# Patient Record
Sex: MALE | Race: WHITE | NOT HISPANIC OR LATINO | Employment: FULL TIME | URBAN - METROPOLITAN AREA
[De-identification: names, ages, dates, MRNs, and addresses within clinical notes are randomized per-mention and may not be internally consistent; named-entity substitution may affect disease eponyms.]

---

## 2020-07-08 ENCOUNTER — CLINICAL SUPPORT (OUTPATIENT)
Dept: URGENT CARE | Facility: CLINIC | Age: 25
End: 2020-07-08
Payer: COMMERCIAL

## 2020-07-08 DIAGNOSIS — Z11.59 ENCOUNTER FOR SCREENING FOR OTHER VIRAL DISEASES: Primary | ICD-10-CM

## 2020-07-08 PROCEDURE — 99211 OFF/OP EST MAY X REQ PHY/QHP: CPT | Mod: S$GLB,CS,, | Performed by: FAMILY MEDICINE

## 2020-07-08 PROCEDURE — 99211 PR OFFICE/OUTPT VISIT, EST, LEVL I: ICD-10-PCS | Mod: S$GLB,CS,, | Performed by: FAMILY MEDICINE

## 2020-07-08 PROCEDURE — U0003 INFECTIOUS AGENT DETECTION BY NUCLEIC ACID (DNA OR RNA); SEVERE ACUTE RESPIRATORY SYNDROME CORONAVIRUS 2 (SARS-COV-2) (CORONAVIRUS DISEASE [COVID-19]), AMPLIFIED PROBE TECHNIQUE, MAKING USE OF HIGH THROUGHPUT TECHNOLOGIES AS DESCRIBED BY CMS-2020-01-R: HCPCS

## 2020-07-08 NOTE — PROGRESS NOTES
NOTE for patient presenting for COVID test with no symptoms. Covid swab ok's by Dr. Georgi Celis

## 2020-07-11 ENCOUNTER — TELEPHONE (OUTPATIENT)
Dept: URGENT CARE | Facility: CLINIC | Age: 25
End: 2020-07-11

## 2020-07-11 DIAGNOSIS — U07.1 COVID-19 VIRUS DETECTED: ICD-10-CM

## 2020-07-11 LAB — SARS-COV-2 RNA RESP QL NAA+PROBE: DETECTED

## 2021-02-01 ENCOUNTER — OFFICE VISIT (OUTPATIENT)
Dept: PRIMARY CARE CLINIC | Facility: CLINIC | Age: 26
End: 2021-02-01
Payer: COMMERCIAL

## 2021-02-01 VITALS
SYSTOLIC BLOOD PRESSURE: 126 MMHG | BODY MASS INDEX: 26.73 KG/M2 | HEART RATE: 48 BPM | HEIGHT: 72 IN | WEIGHT: 197.31 LBS | TEMPERATURE: 98 F | DIASTOLIC BLOOD PRESSURE: 76 MMHG | RESPIRATION RATE: 18 BRPM | OXYGEN SATURATION: 98 %

## 2021-02-01 DIAGNOSIS — M96.1 POSTLAMINECTOMY SYNDROME: ICD-10-CM

## 2021-02-01 DIAGNOSIS — F41.9 ANXIETY: ICD-10-CM

## 2021-02-01 DIAGNOSIS — Z72.51 HIGH RISK SEXUAL BEHAVIOR, UNSPECIFIED TYPE: ICD-10-CM

## 2021-02-01 DIAGNOSIS — R01.1 HEART MURMUR: ICD-10-CM

## 2021-02-01 DIAGNOSIS — Z00.00 NORMAL PHYSICAL EXAM, ROUTINE: Primary | ICD-10-CM

## 2021-02-01 DIAGNOSIS — Z13.220 SCREENING FOR LIPOID DISORDERS: ICD-10-CM

## 2021-02-01 PROBLEM — F32.A DEPRESSION: Status: ACTIVE | Noted: 2021-02-01

## 2021-02-01 PROBLEM — F32.A DEPRESSION: Status: RESOLVED | Noted: 2021-02-01 | Resolved: 2021-02-01

## 2021-02-01 PROCEDURE — 1126F AMNT PAIN NOTED NONE PRSNT: CPT | Mod: S$GLB,,, | Performed by: INTERNAL MEDICINE

## 2021-02-01 PROCEDURE — 99999 PR PBB SHADOW E&M-EST. PATIENT-LVL III: ICD-10-PCS | Mod: PBBFAC,,, | Performed by: INTERNAL MEDICINE

## 2021-02-01 PROCEDURE — 3008F BODY MASS INDEX DOCD: CPT | Mod: CPTII,S$GLB,, | Performed by: INTERNAL MEDICINE

## 2021-02-01 PROCEDURE — 99999 PR PBB SHADOW E&M-EST. PATIENT-LVL III: CPT | Mod: PBBFAC,,, | Performed by: INTERNAL MEDICINE

## 2021-02-01 PROCEDURE — 99385 PREV VISIT NEW AGE 18-39: CPT | Mod: S$GLB,,, | Performed by: INTERNAL MEDICINE

## 2021-02-01 PROCEDURE — 1126F PR PAIN SEVERITY QUANTIFIED, NO PAIN PRESENT: ICD-10-PCS | Mod: S$GLB,,, | Performed by: INTERNAL MEDICINE

## 2021-02-01 PROCEDURE — 99385 PR PREVENTIVE VISIT,NEW,18-39: ICD-10-PCS | Mod: S$GLB,,, | Performed by: INTERNAL MEDICINE

## 2021-02-01 PROCEDURE — 3008F PR BODY MASS INDEX (BMI) DOCUMENTED: ICD-10-PCS | Mod: CPTII,S$GLB,, | Performed by: INTERNAL MEDICINE

## 2021-02-01 RX ORDER — BENZONATATE 200 MG/1
1 CAPSULE ORAL
COMMUNITY
Start: 2020-07-07 | End: 2022-05-17

## 2021-02-01 RX ORDER — SERTRALINE HYDROCHLORIDE 50 MG/1
50 TABLET, FILM COATED ORAL DAILY
COMMUNITY
Start: 2020-11-09 | End: 2021-02-01 | Stop reason: SDUPTHER

## 2021-02-01 RX ORDER — SERTRALINE HYDROCHLORIDE 50 MG/1
50 TABLET, FILM COATED ORAL DAILY
Qty: 90 TABLET | Refills: 3 | Status: SHIPPED | OUTPATIENT
Start: 2021-02-01 | End: 2022-02-06

## 2021-02-01 RX ORDER — COVID-19 MOLECULAR TEST ASSAY
KIT MISCELLANEOUS
COMMUNITY
Start: 2020-12-27 | End: 2021-02-01

## 2021-02-03 ENCOUNTER — LAB VISIT (OUTPATIENT)
Dept: LAB | Facility: HOSPITAL | Age: 26
End: 2021-02-03
Attending: INTERNAL MEDICINE
Payer: COMMERCIAL

## 2021-02-03 DIAGNOSIS — Z72.51 HIGH RISK SEXUAL BEHAVIOR, UNSPECIFIED TYPE: ICD-10-CM

## 2021-02-03 DIAGNOSIS — Z13.220 SCREENING FOR LIPOID DISORDERS: ICD-10-CM

## 2021-02-03 DIAGNOSIS — Z00.00 NORMAL PHYSICAL EXAM, ROUTINE: ICD-10-CM

## 2021-02-03 LAB
BASOPHILS # BLD AUTO: 0.04 K/UL (ref 0–0.2)
BASOPHILS NFR BLD: 1.1 % (ref 0–1.9)
DIFFERENTIAL METHOD: ABNORMAL
EOSINOPHIL # BLD AUTO: 0.1 K/UL (ref 0–0.5)
EOSINOPHIL NFR BLD: 2.1 % (ref 0–8)
ERYTHROCYTE [DISTWIDTH] IN BLOOD BY AUTOMATED COUNT: 11.9 % (ref 11.5–14.5)
HCT VFR BLD AUTO: 44 % (ref 40–54)
HGB BLD-MCNC: 14.6 G/DL (ref 14–18)
IMM GRANULOCYTES # BLD AUTO: 0.01 K/UL (ref 0–0.04)
IMM GRANULOCYTES NFR BLD AUTO: 0.3 % (ref 0–0.5)
LYMPHOCYTES # BLD AUTO: 1.4 K/UL (ref 1–4.8)
LYMPHOCYTES NFR BLD: 37.4 % (ref 18–48)
MCH RBC QN AUTO: 29.9 PG (ref 27–31)
MCHC RBC AUTO-ENTMCNC: 33.2 G/DL (ref 32–36)
MCV RBC AUTO: 90 FL (ref 82–98)
MONOCYTES # BLD AUTO: 0.4 K/UL (ref 0.3–1)
MONOCYTES NFR BLD: 10.3 % (ref 4–15)
NEUTROPHILS # BLD AUTO: 1.8 K/UL (ref 1.8–7.7)
NEUTROPHILS NFR BLD: 48.8 % (ref 38–73)
NRBC BLD-RTO: 0 /100 WBC
PLATELET # BLD AUTO: 255 K/UL (ref 150–350)
PMV BLD AUTO: 12.4 FL (ref 9.2–12.9)
RBC # BLD AUTO: 4.88 M/UL (ref 4.6–6.2)
WBC # BLD AUTO: 3.77 K/UL (ref 3.9–12.7)

## 2021-02-03 PROCEDURE — 80074 ACUTE HEPATITIS PANEL: CPT

## 2021-02-03 PROCEDURE — 86703 HIV-1/HIV-2 1 RESULT ANTBDY: CPT

## 2021-02-03 PROCEDURE — 86592 SYPHILIS TEST NON-TREP QUAL: CPT

## 2021-02-03 PROCEDURE — 36415 COLL VENOUS BLD VENIPUNCTURE: CPT | Mod: PN

## 2021-02-03 PROCEDURE — 85025 COMPLETE CBC W/AUTO DIFF WBC: CPT

## 2021-02-03 PROCEDURE — 86695 HERPES SIMPLEX TYPE 1 TEST: CPT

## 2021-02-03 PROCEDURE — 80061 LIPID PANEL: CPT

## 2021-02-03 PROCEDURE — 84439 ASSAY OF FREE THYROXINE: CPT

## 2021-02-03 PROCEDURE — 86694 HERPES SIMPLEX NES ANTBDY: CPT

## 2021-02-03 PROCEDURE — 84443 ASSAY THYROID STIM HORMONE: CPT

## 2021-02-03 PROCEDURE — 86696 HERPES SIMPLEX TYPE 2 TEST: CPT

## 2021-02-03 PROCEDURE — 80053 COMPREHEN METABOLIC PANEL: CPT

## 2021-02-04 LAB
ALBUMIN SERPL BCP-MCNC: 4.8 G/DL (ref 3.5–5.2)
ALP SERPL-CCNC: 52 U/L (ref 55–135)
ALT SERPL W/O P-5'-P-CCNC: 17 U/L (ref 10–44)
ANION GAP SERPL CALC-SCNC: 14 MMOL/L (ref 8–16)
AST SERPL-CCNC: 25 U/L (ref 10–40)
BILIRUB SERPL-MCNC: 2.3 MG/DL (ref 0.1–1)
BUN SERPL-MCNC: 13 MG/DL (ref 6–20)
CALCIUM SERPL-MCNC: 10.2 MG/DL (ref 8.7–10.5)
CHLORIDE SERPL-SCNC: 103 MMOL/L (ref 95–110)
CHOLEST SERPL-MCNC: 186 MG/DL (ref 120–199)
CHOLEST/HDLC SERPL: 3.4 {RATIO} (ref 2–5)
CO2 SERPL-SCNC: 23 MMOL/L (ref 23–29)
CREAT SERPL-MCNC: 1 MG/DL (ref 0.5–1.4)
EST. GFR  (AFRICAN AMERICAN): >60 ML/MIN/1.73 M^2
EST. GFR  (NON AFRICAN AMERICAN): >60 ML/MIN/1.73 M^2
GLUCOSE SERPL-MCNC: 73 MG/DL (ref 70–110)
HAV IGM SERPL QL IA: NEGATIVE
HBV CORE IGM SERPL QL IA: NEGATIVE
HBV SURFACE AG SERPL QL IA: NEGATIVE
HCV AB SERPL QL IA: NEGATIVE
HDLC SERPL-MCNC: 54 MG/DL (ref 40–75)
HDLC SERPL: 29 % (ref 20–50)
HIV 1+2 AB+HIV1 P24 AG SERPL QL IA: NEGATIVE
HSV1 IGG SERPL QL IA: NEGATIVE
HSV2 IGG SERPL QL IA: NEGATIVE
LDLC SERPL CALC-MCNC: 112.2 MG/DL (ref 63–159)
NONHDLC SERPL-MCNC: 132 MG/DL
POTASSIUM SERPL-SCNC: 4 MMOL/L (ref 3.5–5.1)
PROT SERPL-MCNC: 7.3 G/DL (ref 6–8.4)
RPR SER QL: NORMAL
SODIUM SERPL-SCNC: 140 MMOL/L (ref 136–145)
T4 FREE SERPL-MCNC: 1.06 NG/DL (ref 0.71–1.51)
TRIGL SERPL-MCNC: 99 MG/DL (ref 30–150)
TSH SERPL DL<=0.005 MIU/L-ACNC: 2.27 UIU/ML (ref 0.4–4)

## 2021-02-08 LAB — HSV AB, IGM BY EIA: 0.25 INDEX

## 2021-02-10 ENCOUNTER — OFFICE VISIT (OUTPATIENT)
Dept: URGENT CARE | Facility: CLINIC | Age: 26
End: 2021-02-10
Payer: COMMERCIAL

## 2021-02-10 VITALS
HEIGHT: 72 IN | RESPIRATION RATE: 19 BRPM | DIASTOLIC BLOOD PRESSURE: 75 MMHG | HEART RATE: 47 BPM | BODY MASS INDEX: 25.06 KG/M2 | TEMPERATURE: 98 F | OXYGEN SATURATION: 100 % | SYSTOLIC BLOOD PRESSURE: 133 MMHG | WEIGHT: 185 LBS

## 2021-02-10 DIAGNOSIS — D72.819 LEUKOPENIA, UNSPECIFIED TYPE: ICD-10-CM

## 2021-02-10 DIAGNOSIS — J98.01 ACUTE BRONCHOSPASM: Primary | ICD-10-CM

## 2021-02-10 DIAGNOSIS — E80.6 HYPERBILIRUBINEMIA: Primary | ICD-10-CM

## 2021-02-10 DIAGNOSIS — J20.9 ACUTE PURULENT BRONCHITIS: ICD-10-CM

## 2021-02-10 PROCEDURE — 3008F PR BODY MASS INDEX (BMI) DOCUMENTED: ICD-10-PCS | Mod: CPTII,S$GLB,, | Performed by: INTERNAL MEDICINE

## 2021-02-10 PROCEDURE — 96372 THER/PROPH/DIAG INJ SC/IM: CPT | Mod: S$GLB,,, | Performed by: INTERNAL MEDICINE

## 2021-02-10 PROCEDURE — 99214 PR OFFICE/OUTPT VISIT, EST, LEVL IV, 30-39 MIN: ICD-10-PCS | Mod: 25,S$GLB,, | Performed by: INTERNAL MEDICINE

## 2021-02-10 PROCEDURE — 99214 OFFICE O/P EST MOD 30 MIN: CPT | Mod: 25,S$GLB,, | Performed by: INTERNAL MEDICINE

## 2021-02-10 PROCEDURE — 3008F BODY MASS INDEX DOCD: CPT | Mod: CPTII,S$GLB,, | Performed by: INTERNAL MEDICINE

## 2021-02-10 PROCEDURE — 96372 PR INJECTION,THERAP/PROPH/DIAG2ST, IM OR SUBCUT: ICD-10-PCS | Mod: S$GLB,,, | Performed by: INTERNAL MEDICINE

## 2021-02-10 RX ORDER — DEXAMETHASONE SODIUM PHOSPHATE 100 MG/10ML
10 INJECTION INTRAMUSCULAR; INTRAVENOUS
Status: COMPLETED | OUTPATIENT
Start: 2021-02-10 | End: 2021-02-10

## 2021-02-10 RX ADMIN — DEXAMETHASONE SODIUM PHOSPHATE 10 MG: 100 INJECTION INTRAMUSCULAR; INTRAVENOUS at 01:02

## 2021-02-22 ENCOUNTER — PATIENT MESSAGE (OUTPATIENT)
Dept: PRIMARY CARE CLINIC | Facility: CLINIC | Age: 26
End: 2021-02-22

## 2021-04-12 ENCOUNTER — PATIENT MESSAGE (OUTPATIENT)
Dept: RESEARCH | Facility: HOSPITAL | Age: 26
End: 2021-04-12

## 2021-08-18 ENCOUNTER — OFFICE VISIT (OUTPATIENT)
Dept: URGENT CARE | Facility: CLINIC | Age: 26
End: 2021-08-18
Payer: COMMERCIAL

## 2021-08-18 VITALS
OXYGEN SATURATION: 98 % | BODY MASS INDEX: 25.06 KG/M2 | SYSTOLIC BLOOD PRESSURE: 129 MMHG | DIASTOLIC BLOOD PRESSURE: 75 MMHG | HEIGHT: 72 IN | HEART RATE: 47 BPM | WEIGHT: 185 LBS | TEMPERATURE: 98 F | RESPIRATION RATE: 16 BRPM

## 2021-08-18 DIAGNOSIS — R05.9 COUGH: Primary | ICD-10-CM

## 2021-08-18 DIAGNOSIS — J01.40 ACUTE PANSINUSITIS, RECURRENCE NOT SPECIFIED: ICD-10-CM

## 2021-08-18 DIAGNOSIS — J20.9 ACUTE PURULENT BRONCHITIS: ICD-10-CM

## 2021-08-18 DIAGNOSIS — J98.01 ACUTE BRONCHOSPASM: ICD-10-CM

## 2021-08-18 LAB
CTP QC/QA: YES
SARS-COV-2 RDRP RESP QL NAA+PROBE: NEGATIVE

## 2021-08-18 PROCEDURE — 3008F BODY MASS INDEX DOCD: CPT | Mod: CPTII,S$GLB,, | Performed by: INTERNAL MEDICINE

## 2021-08-18 PROCEDURE — 1160F PR REVIEW ALL MEDS BY PRESCRIBER/CLIN PHARMACIST DOCUMENTED: ICD-10-PCS | Mod: CPTII,S$GLB,, | Performed by: INTERNAL MEDICINE

## 2021-08-18 PROCEDURE — 3074F SYST BP LT 130 MM HG: CPT | Mod: CPTII,S$GLB,, | Performed by: INTERNAL MEDICINE

## 2021-08-18 PROCEDURE — 99214 PR OFFICE/OUTPT VISIT, EST, LEVL IV, 30-39 MIN: ICD-10-PCS | Mod: 25,S$GLB,CS, | Performed by: INTERNAL MEDICINE

## 2021-08-18 PROCEDURE — U0002: ICD-10-PCS | Mod: QW,S$GLB,, | Performed by: INTERNAL MEDICINE

## 2021-08-18 PROCEDURE — 3008F PR BODY MASS INDEX (BMI) DOCUMENTED: ICD-10-PCS | Mod: CPTII,S$GLB,, | Performed by: INTERNAL MEDICINE

## 2021-08-18 PROCEDURE — 1159F MED LIST DOCD IN RCRD: CPT | Mod: CPTII,S$GLB,, | Performed by: INTERNAL MEDICINE

## 2021-08-18 PROCEDURE — 96372 PR INJECTION,THERAP/PROPH/DIAG2ST, IM OR SUBCUT: ICD-10-PCS | Mod: S$GLB,,, | Performed by: INTERNAL MEDICINE

## 2021-08-18 PROCEDURE — 1160F RVW MEDS BY RX/DR IN RCRD: CPT | Mod: CPTII,S$GLB,, | Performed by: INTERNAL MEDICINE

## 2021-08-18 PROCEDURE — 3078F DIAST BP <80 MM HG: CPT | Mod: CPTII,S$GLB,, | Performed by: INTERNAL MEDICINE

## 2021-08-18 PROCEDURE — U0002 COVID-19 LAB TEST NON-CDC: HCPCS | Mod: QW,S$GLB,, | Performed by: INTERNAL MEDICINE

## 2021-08-18 PROCEDURE — 1159F PR MEDICATION LIST DOCUMENTED IN MEDICAL RECORD: ICD-10-PCS | Mod: CPTII,S$GLB,, | Performed by: INTERNAL MEDICINE

## 2021-08-18 PROCEDURE — 3074F PR MOST RECENT SYSTOLIC BLOOD PRESSURE < 130 MM HG: ICD-10-PCS | Mod: CPTII,S$GLB,, | Performed by: INTERNAL MEDICINE

## 2021-08-18 PROCEDURE — 99214 OFFICE O/P EST MOD 30 MIN: CPT | Mod: 25,S$GLB,CS, | Performed by: INTERNAL MEDICINE

## 2021-08-18 PROCEDURE — 3078F PR MOST RECENT DIASTOLIC BLOOD PRESSURE < 80 MM HG: ICD-10-PCS | Mod: CPTII,S$GLB,, | Performed by: INTERNAL MEDICINE

## 2021-08-18 PROCEDURE — 96372 THER/PROPH/DIAG INJ SC/IM: CPT | Mod: S$GLB,,, | Performed by: INTERNAL MEDICINE

## 2021-08-18 RX ORDER — DEXAMETHASONE SODIUM PHOSPHATE 100 MG/10ML
10 INJECTION INTRAMUSCULAR; INTRAVENOUS
Status: COMPLETED | OUTPATIENT
Start: 2021-08-18 | End: 2021-08-18

## 2021-08-18 RX ADMIN — DEXAMETHASONE SODIUM PHOSPHATE 10 MG: 100 INJECTION INTRAMUSCULAR; INTRAVENOUS at 10:08

## 2021-12-01 ENCOUNTER — TELEPHONE (OUTPATIENT)
Dept: SURGERY | Facility: CLINIC | Age: 26
End: 2021-12-01
Payer: COMMERCIAL

## 2021-12-09 ENCOUNTER — OFFICE VISIT (OUTPATIENT)
Dept: SURGERY | Facility: CLINIC | Age: 26
End: 2021-12-09
Payer: COMMERCIAL

## 2021-12-09 VITALS
HEIGHT: 72 IN | SYSTOLIC BLOOD PRESSURE: 150 MMHG | HEART RATE: 82 BPM | DIASTOLIC BLOOD PRESSURE: 99 MMHG | WEIGHT: 187 LBS | BODY MASS INDEX: 25.33 KG/M2

## 2021-12-09 DIAGNOSIS — L29.0 PERIANAL IRRITATION: Primary | ICD-10-CM

## 2021-12-09 PROCEDURE — 99999 PR PBB SHADOW E&M-EST. PATIENT-LVL III: CPT | Mod: PBBFAC,,, | Performed by: COLON & RECTAL SURGERY

## 2021-12-09 PROCEDURE — 99203 OFFICE O/P NEW LOW 30 MIN: CPT | Mod: 25,S$GLB,, | Performed by: COLON & RECTAL SURGERY

## 2021-12-09 PROCEDURE — 99999 PR PBB SHADOW E&M-EST. PATIENT-LVL III: ICD-10-PCS | Mod: PBBFAC,,, | Performed by: COLON & RECTAL SURGERY

## 2021-12-09 PROCEDURE — 99203 PR OFFICE/OUTPT VISIT, NEW, LEVL III, 30-44 MIN: ICD-10-PCS | Mod: 25,S$GLB,, | Performed by: COLON & RECTAL SURGERY

## 2022-02-05 DIAGNOSIS — F41.9 ANXIETY: ICD-10-CM

## 2022-02-05 NOTE — TELEPHONE ENCOUNTER
Care Due:                  Date            Visit Type   Department     Provider  --------------------------------------------------------------------------------                                NP -                              PRIMARY      [facility]  Last Visit: 02-      CARE ([facility])   CARE           Becca Bynum  Next Visit: None Scheduled  None         None Found                                                            Last  Test          Frequency    Reason                     Performed    Due Date  --------------------------------------------------------------------------------    Office Visit  12 months..  sertraline...............  02- 01-    Powered by [employer]. Reference number: [id number].   2/05/2022 12:23:03 PM CST

## 2022-02-06 RX ORDER — SERTRALINE HYDROCHLORIDE 50 MG/1
50 TABLET, FILM COATED ORAL DAILY
Qty: 90 TABLET | Refills: 0 | Status: SHIPPED | OUTPATIENT
Start: 2022-02-06 | End: 2022-05-17 | Stop reason: SDUPTHER

## 2022-02-16 ENCOUNTER — PATIENT MESSAGE (OUTPATIENT)
Dept: RESEARCH | Facility: HOSPITAL | Age: 27
End: 2022-02-16
Payer: COMMERCIAL

## 2022-05-17 ENCOUNTER — OFFICE VISIT (OUTPATIENT)
Dept: PRIMARY CARE CLINIC | Facility: CLINIC | Age: 27
End: 2022-05-17
Payer: COMMERCIAL

## 2022-05-17 VITALS
BODY MASS INDEX: 26.25 KG/M2 | SYSTOLIC BLOOD PRESSURE: 121 MMHG | TEMPERATURE: 98 F | DIASTOLIC BLOOD PRESSURE: 81 MMHG | RESPIRATION RATE: 18 BRPM | OXYGEN SATURATION: 99 % | HEART RATE: 64 BPM | HEIGHT: 72 IN | WEIGHT: 193.81 LBS

## 2022-05-17 DIAGNOSIS — E80.6 HYPERBILIRUBINEMIA: ICD-10-CM

## 2022-05-17 DIAGNOSIS — Z00.00 NORMAL PHYSICAL EXAM, ROUTINE: Primary | ICD-10-CM

## 2022-05-17 DIAGNOSIS — F41.9 ANXIETY: ICD-10-CM

## 2022-05-17 DIAGNOSIS — Z72.51 HIGH RISK SEXUAL BEHAVIOR, UNSPECIFIED TYPE: ICD-10-CM

## 2022-05-17 DIAGNOSIS — Z13.220 SCREENING FOR LIPOID DISORDERS: ICD-10-CM

## 2022-05-17 DIAGNOSIS — R41.840 CONCENTRATION DEFICIT: ICD-10-CM

## 2022-05-17 DIAGNOSIS — D72.819 LEUKOPENIA, UNSPECIFIED TYPE: ICD-10-CM

## 2022-05-17 PROCEDURE — 3008F PR BODY MASS INDEX (BMI) DOCUMENTED: ICD-10-PCS | Mod: CPTII,S$GLB,, | Performed by: INTERNAL MEDICINE

## 2022-05-17 PROCEDURE — 3008F BODY MASS INDEX DOCD: CPT | Mod: CPTII,S$GLB,, | Performed by: INTERNAL MEDICINE

## 2022-05-17 PROCEDURE — 87491 CHLMYD TRACH DNA AMP PROBE: CPT | Performed by: INTERNAL MEDICINE

## 2022-05-17 PROCEDURE — 99395 PR PREVENTIVE VISIT,EST,18-39: ICD-10-PCS | Mod: S$GLB,,, | Performed by: INTERNAL MEDICINE

## 2022-05-17 PROCEDURE — 1159F PR MEDICATION LIST DOCUMENTED IN MEDICAL RECORD: ICD-10-PCS | Mod: CPTII,S$GLB,, | Performed by: INTERNAL MEDICINE

## 2022-05-17 PROCEDURE — 99999 PR PBB SHADOW E&M-EST. PATIENT-LVL IV: CPT | Mod: PBBFAC,,, | Performed by: INTERNAL MEDICINE

## 2022-05-17 PROCEDURE — 87591 N.GONORRHOEAE DNA AMP PROB: CPT | Performed by: INTERNAL MEDICINE

## 2022-05-17 PROCEDURE — 3074F PR MOST RECENT SYSTOLIC BLOOD PRESSURE < 130 MM HG: ICD-10-PCS | Mod: CPTII,S$GLB,, | Performed by: INTERNAL MEDICINE

## 2022-05-17 PROCEDURE — 1160F PR REVIEW ALL MEDS BY PRESCRIBER/CLIN PHARMACIST DOCUMENTED: ICD-10-PCS | Mod: CPTII,S$GLB,, | Performed by: INTERNAL MEDICINE

## 2022-05-17 PROCEDURE — 3074F SYST BP LT 130 MM HG: CPT | Mod: CPTII,S$GLB,, | Performed by: INTERNAL MEDICINE

## 2022-05-17 PROCEDURE — 3079F DIAST BP 80-89 MM HG: CPT | Mod: CPTII,S$GLB,, | Performed by: INTERNAL MEDICINE

## 2022-05-17 PROCEDURE — 3079F PR MOST RECENT DIASTOLIC BLOOD PRESSURE 80-89 MM HG: ICD-10-PCS | Mod: CPTII,S$GLB,, | Performed by: INTERNAL MEDICINE

## 2022-05-17 PROCEDURE — 99999 PR PBB SHADOW E&M-EST. PATIENT-LVL IV: ICD-10-PCS | Mod: PBBFAC,,, | Performed by: INTERNAL MEDICINE

## 2022-05-17 PROCEDURE — 1159F MED LIST DOCD IN RCRD: CPT | Mod: CPTII,S$GLB,, | Performed by: INTERNAL MEDICINE

## 2022-05-17 PROCEDURE — 99395 PREV VISIT EST AGE 18-39: CPT | Mod: S$GLB,,, | Performed by: INTERNAL MEDICINE

## 2022-05-17 PROCEDURE — 1160F RVW MEDS BY RX/DR IN RCRD: CPT | Mod: CPTII,S$GLB,, | Performed by: INTERNAL MEDICINE

## 2022-05-17 RX ORDER — PREDNISONE 5 MG/1
TABLET ORAL
COMMUNITY
Start: 2021-12-21 | End: 2022-05-17

## 2022-05-17 RX ORDER — SERTRALINE HYDROCHLORIDE 50 MG/1
50 TABLET, FILM COATED ORAL DAILY
Qty: 90 TABLET | Refills: 3 | Status: SHIPPED | OUTPATIENT
Start: 2022-05-17 | End: 2023-07-11

## 2022-05-17 RX ORDER — INFLUENZA VIRUS VACCINE 15; 15; 15; 15 UG/.5ML; UG/.5ML; UG/.5ML; UG/.5ML
SUSPENSION INTRAMUSCULAR
COMMUNITY
Start: 2021-12-14

## 2022-05-17 NOTE — PROGRESS NOTES
[facility] Note    Chief Complaint      Chief Complaint   Patient presents with    Annual Exam       History of Present Illness      Maik Orr Jr. is a 26 y.o. . Anxiety, DJD - L spine, Postlaminectomy Syndrome presents to fu for well visit. Referred by his Mom- Pati Orr. Last visit - 2/1/21    Hyperbilirubinemia -  total bilirubin level is mildly elevated. This has been elevated in the past.  This is usually benign & not worrisome. It is common & can  elevate when you are dehydrated (as with fasting for lab work). It won't cause any problems. Sometimes this can be mildly elevated due to a genetic disorder called Gilbert's which does not require any intervention or treatment. I would just like to repeat this.    Leukopenia - White blood cell count was just below normal. We can repeat this with upcoming labs      Anxiety - Pt on sertraline 50 mg/d.  Doing well.  He was on paxil in past. Had panic attacks in H.S. and was started on paxil and tried to wean off in [employer] and had panic attacks and restarted medication.  He tried gluten free diet - no help. Does well on the sertraline. No SI, no HI.  He just bought a new house.      DJD L -spine -  Was working  out 5 d/wk until he tore rotator cuff.  He is not on any meds for this. He gets injections prn.      Postlaminectomy Syndrome - Was working out 5 d/wk.  He is not on any meds for this. He gets injections prn.      Lumbar stenosis -  Was working out 5 d/wk.  He is not on any meds for this. He gets injections prn.      Heart murmur -   Pt aware since childhood. Had prev stress Echo. EKG with Cards.     Left rotator cuff tear and labrum tear - Pt is in Ptx. Considering possible Sx in Aug. Fu by Dr. Heath. Inj did not help.     Concentration deficit - Pt has never been formally tested. Rec he do so. He takes 5 mg 1-2/wk since College. He is a  and it helps him focus.      H/o COVID - 19 - 7/8/20 - no lingering effects.      HCM - Flu  - '20;  Tdap - ?;  Gardasil - 7/9/12;  Hep C Screen - utd - neg;  HIV Screen - utd - neg; C-scope - none;  Prev PCP -Dr. Florian Schaefer at [facility] and saw Dr. Fulton at [facility] once; Ortho - Dr. Yeung; Pain Mgmnt - ?; Ortho - Dr. Norman/Ivana;CRS - Dr. Robert     Patient Care Team:  Becca Bynum MD as PCP - General (Internal Medicine)  Milvia Fierro MA as Care Coordinator     Health Maintenance:  Immunization History   Administered Date(s) Administered    DTaP 1994, 1994, 01/15/1995, 01/03/1996, 07/13/1999    HIB 1994, 1994, 01/05/1995, 10/12/1995    HPV Quadrivalent 07/09/2012    Hepatitis B, Pediatric/Adolescent 1994, 1994, 04/03/1995    IPV 1994, 1994, 01/03/1996, 07/13/1999    Influenza - Intranasal 10/02/2007    Influenza - Quadrivalent - MDCK - PF 10/30/2017, 11/13/2019    Influenza - Quadrivalent - PF *Preferred* (6 months and older) 10/16/2002, 10/15/2003, 10/08/2004, 10/26/2005, 10/20/2006, 09/16/2009, 10/20/2014, 10/05/2015, 10/12/2016, 11/09/2020    Influenza A (H1N1) 2009 Monovalent - IM 11/03/2009    MMR 10/12/1995, 07/13/1999    Meningococcal Conjugate (MCV4P) 08/02/2005, 07/09/2012    PPD Test 07/07/1995    Td (Adult), Unspecified Formulation 08/02/2005    Tdap 08/04/2010    Varicella 03/01/2007, 07/08/2009      Health Maintenance   Topic Date Due    TETANUS VACCINE  08/04/2020    Hepatitis C Screening  Completed    Lipid Panel  Completed        Past Medical History:  Past Medical History:   Diagnosis Date    Anxiety        Past Surgical History:   has a past surgical history that includes Lumbar discectomy; Foraminotomy; Laminectomy; and Epidural steroid injection into lumbar spine.    Family History:  family history includes Bladder Cancer in his maternal grandmother; Lung cancer in his paternal grandfather; Ovarian cancer in his maternal grandmother; Suicidality in his maternal grandfather.     Social  History:  Social History     Tobacco Use    Smoking status: Never Smoker    Smokeless tobacco: Never Used   Substance Use Topics    Alcohol use: Not Currently     Comment:  a few drinks on the weekend    Drug use: Not Currently     Types: Marijuana       Review of Systems   Constitutional: Negative for activity change and unexpected weight change.   HENT: Negative for hearing loss and trouble swallowing.    Eyes: Negative for discharge.   Respiratory: Negative for chest tightness and wheezing.    Cardiovascular: Negative for chest pain and palpitations.   Gastrointestinal: Negative for constipation, diarrhea and vomiting.   Genitourinary: Negative for difficulty urinating and hematuria.   Musculoskeletal: Positive for arthralgias. Negative for neck pain.        Left shoulder with abduction   Neurological: Negative for headaches.   Psychiatric/Behavioral: Negative for dysphoric mood and suicidal ideas. The patient is nervous/anxious.         Stable.         Medications:    Current Outpatient Medications:     FLUARIX QUAD 7807-8185, PF, 60 mcg (15 mcg x 4)/0.5 mL Syrg, , Disp: , Rfl:     sertraline (ZOLOFT) 50 MG tablet, Take 1 tablet (50 mg total) by mouth once daily., Disp: 90 tablet, Rfl: 3     Allergies:  Review of patient's allergies indicates:  No Known Allergies    Physical Exam      Vital Signs  Temp: 98 °F (36.7 °C)  Pulse: 64  Resp: 18  SpO2: 99 %  BP: 121/81  BP Location: Left arm  Patient Position: Sitting  Pain Score: 0-No pain  Height and Weight  Height: 6' (182.9 cm)  Weight: 87.9 kg (193 lb 12.6 oz)  BSA (Calculated - sq m): 2.11 sq meters  BMI (Calculated): 26.3  Weight in (lb) to have BMI = 25: 183.9      Patient Position: Sitting      Physical Exam  Vitals reviewed.   Constitutional:       General: He is not in acute distress.     Appearance: Normal appearance. He is not ill-appearing, toxic-appearing or diaphoretic.   HENT:      Head: Normocephalic and atraumatic.      Right Ear: Tympanic  membrane normal.      Left Ear: Tympanic membrane normal.      Mouth/Throat:      Mouth: Mucous membranes are moist.      Pharynx: No posterior oropharyngeal erythema.   Eyes:      Extraocular Movements: Extraocular movements intact.      Conjunctiva/sclera: Conjunctivae normal.      Pupils: Pupils are equal, round, and reactive to light.   Neck:      Vascular: No carotid bruit.   Cardiovascular:      Rate and Rhythm: Normal rate and regular rhythm.      Pulses: Normal pulses.      Heart sounds: Murmur heard.      Comments: III/VI HSM radiates to sternal notch and carotids  Pulmonary:      Effort: No respiratory distress.      Breath sounds: Normal breath sounds. No wheezing.   Abdominal:      General: Bowel sounds are normal. There is no distension.      Palpations: Abdomen is soft.      Tenderness: There is no abdominal tenderness. There is no guarding or rebound.   Musculoskeletal:         General: Normal range of motion.   Skin:     General: Skin is warm.   Neurological:      General: No focal deficit present.      Mental Status: He is alert and oriented to person, place, and time.   Psychiatric:         Mood and Affect: Mood normal.         Behavior: Behavior normal.          Laboratory:  CBC:  Recent Labs   Lab 02/03/21  0816   WBC 3.77 L   RBC 4.88   Hemoglobin 14.6   Hematocrit 44.0   Platelets 255   MCV 90   MCH 29.9   MCHC 33.2       CMP:  Recent Labs   Lab 02/03/21  0816   Glucose 73   Calcium 10.2   Albumin 4.8   Total Protein 7.3   Sodium 140   Potassium 4.0   CO2 23   Chloride 103   BUN 13   Creatinine 1.0   Alkaline Phosphatase 52 L   ALT 17   AST 25   Total Bilirubin 2.3 H     LIPIDS:  Recent Labs   Lab 02/03/21  0816   TSH 2.265   HDL 54   Cholesterol 186   Triglycerides 99   LDL Cholesterol 112.2   HDL/Cholesterol Ratio 29.0   Non-HDL Cholesterol 132   Total Cholesterol/HDL Ratio 3.4       TSH:  Recent Labs   Lab 02/03/21  0816   TSH 2.265         Recent Labs   Lab 02/03/21  0816   Hepatitis C Ab  Negative       Assessment/Plan     Maik Orr Jr. is a 26 y.o.male with:    Normal physical exam, routine  -     CBC Auto Differential; Future; Expected date: 05/17/2022  -     T4, Free; Future; Expected date: 05/17/2022  -     TSH; Future; Expected date: 05/17/2022  -     Basic Metabolic Panel; Future; Expected date: 05/17/2022  -     Hepatic Function Panel; Future; Expected date: 05/17/2022  - Performed today.  Will check Basic labs.  RTC in 1 yr for fu or sooner if needed    Hyperbilirubinemia  - Check fractionated bili. Suspect Gilbert's.    Leukopenia, unspecified type  - Repeat CBC.     Anxiety  -     sertraline (ZOLOFT) 50 MG tablet; Take 1 tablet (50 mg total) by mouth once daily.  Dispense: 90 tablet; Refill: 3  - Stable.  Cont current regimen.    Concentration deficit  -     Ambulatory referral/consult to Psychology; Future; Expected date: 05/24/2022  - Refer for formal testing.     High risk sexual behavior, unspecified type  -     RPR; Future; Expected date: 05/17/2022  -     C. trachomatis/N. gonorrhoeae by AMP DNA [facility]; Urine; Future; Expected date: 05/17/2022  -     Hepatitis Panel, Acute; Future; Expected date: 05/17/2022  -     HIV 1/2 Ag/Ab (4th Gen); Future; Expected date: 05/17/2022  -     HERPES SIMPLEX 1&2 IGG; Future; Expected date: 05/17/2022  -     HERPES SIMPLEX 1 & 2 IGM; Future; Expected date: 05/17/2022    Screening for lipoid disorders  -     Lipid Panel; Future; Expected date: 05/17/2022         Chronic conditions status updated as per HPI.  Other than changes above, cont current medications and maintain follow up with specialists.  Follow up in about 1 year (around 5/17/2023) for well visit or sooner if needed.      Nicole Giambrone, MD [facility]

## 2022-05-18 LAB
C TRACH DNA SPEC QL NAA+PROBE: NOT DETECTED
N GONORRHOEA DNA SPEC QL NAA+PROBE: NOT DETECTED

## 2022-05-18 NOTE — PROGRESS NOTES
I sent pt a my chart message -  I reviewed your Chlamydia and gonorrhea screenings which were both negative.     Dr. RAMOS

## 2022-08-22 PROBLEM — Z00.00 NORMAL PHYSICAL EXAM, ROUTINE: Status: RESOLVED | Noted: 2022-05-17 | Resolved: 2022-08-22

## 2022-09-14 ENCOUNTER — OFFICE VISIT (OUTPATIENT)
Dept: URGENT CARE | Facility: CLINIC | Age: 27
End: 2022-09-14
Payer: COMMERCIAL

## 2022-09-14 VITALS
SYSTOLIC BLOOD PRESSURE: 136 MMHG | BODY MASS INDEX: 26.14 KG/M2 | TEMPERATURE: 98 F | HEIGHT: 72 IN | DIASTOLIC BLOOD PRESSURE: 92 MMHG | WEIGHT: 193 LBS | RESPIRATION RATE: 18 BRPM | OXYGEN SATURATION: 97 % | HEART RATE: 66 BPM

## 2022-09-14 DIAGNOSIS — R06.2 WHEEZING: ICD-10-CM

## 2022-09-14 DIAGNOSIS — J02.9 SORE THROAT: ICD-10-CM

## 2022-09-14 DIAGNOSIS — R05.9 COUGH: Primary | ICD-10-CM

## 2022-09-14 LAB
CTP QC/QA: YES
MOLECULAR STREP A: NEGATIVE
POC MOLECULAR INFLUENZA A AGN: NEGATIVE
POC MOLECULAR INFLUENZA B AGN: NEGATIVE
SARS-COV-2 RDRP RESP QL NAA+PROBE: NEGATIVE

## 2022-09-14 PROCEDURE — 3008F BODY MASS INDEX DOCD: CPT | Mod: CPTII,S$GLB,, | Performed by: FAMILY MEDICINE

## 2022-09-14 PROCEDURE — U0002 COVID-19 LAB TEST NON-CDC: HCPCS | Mod: QW,S$GLB,, | Performed by: FAMILY MEDICINE

## 2022-09-14 PROCEDURE — 1159F MED LIST DOCD IN RCRD: CPT | Mod: CPTII,S$GLB,, | Performed by: FAMILY MEDICINE

## 2022-09-14 PROCEDURE — 87651 POCT STREP A MOLECULAR: ICD-10-PCS | Mod: QW,S$GLB,, | Performed by: FAMILY MEDICINE

## 2022-09-14 PROCEDURE — 1159F PR MEDICATION LIST DOCUMENTED IN MEDICAL RECORD: ICD-10-PCS | Mod: CPTII,S$GLB,, | Performed by: FAMILY MEDICINE

## 2022-09-14 PROCEDURE — 3075F SYST BP GE 130 - 139MM HG: CPT | Mod: CPTII,S$GLB,, | Performed by: FAMILY MEDICINE

## 2022-09-14 PROCEDURE — 3008F PR BODY MASS INDEX (BMI) DOCUMENTED: ICD-10-PCS | Mod: CPTII,S$GLB,, | Performed by: FAMILY MEDICINE

## 2022-09-14 PROCEDURE — 99214 OFFICE O/P EST MOD 30 MIN: CPT | Mod: 25,S$GLB,, | Performed by: FAMILY MEDICINE

## 2022-09-14 PROCEDURE — 99214 PR OFFICE/OUTPT VISIT, EST, LEVL IV, 30-39 MIN: ICD-10-PCS | Mod: 25,S$GLB,, | Performed by: FAMILY MEDICINE

## 2022-09-14 PROCEDURE — 96372 PR INJECTION,THERAP/PROPH/DIAG2ST, IM OR SUBCUT: ICD-10-PCS | Mod: S$GLB,,, | Performed by: FAMILY MEDICINE

## 2022-09-14 PROCEDURE — 87651 STREP A DNA AMP PROBE: CPT | Mod: QW,S$GLB,, | Performed by: FAMILY MEDICINE

## 2022-09-14 PROCEDURE — 87502 POCT INFLUENZA A/B MOLECULAR: ICD-10-PCS | Mod: QW,S$GLB,, | Performed by: FAMILY MEDICINE

## 2022-09-14 PROCEDURE — 3080F PR MOST RECENT DIASTOLIC BLOOD PRESSURE >= 90 MM HG: ICD-10-PCS | Mod: CPTII,S$GLB,, | Performed by: FAMILY MEDICINE

## 2022-09-14 PROCEDURE — 96372 THER/PROPH/DIAG INJ SC/IM: CPT | Mod: S$GLB,,, | Performed by: FAMILY MEDICINE

## 2022-09-14 PROCEDURE — 87502 INFLUENZA DNA AMP PROBE: CPT | Mod: QW,S$GLB,, | Performed by: FAMILY MEDICINE

## 2022-09-14 PROCEDURE — 3080F DIAST BP >= 90 MM HG: CPT | Mod: CPTII,S$GLB,, | Performed by: FAMILY MEDICINE

## 2022-09-14 PROCEDURE — U0002: ICD-10-PCS | Mod: QW,S$GLB,, | Performed by: FAMILY MEDICINE

## 2022-09-14 PROCEDURE — 3075F PR MOST RECENT SYSTOLIC BLOOD PRESS GE 130-139MM HG: ICD-10-PCS | Mod: CPTII,S$GLB,, | Performed by: FAMILY MEDICINE

## 2022-09-14 RX ORDER — DEXAMETHASONE SODIUM PHOSPHATE 100 MG/10ML
4 INJECTION INTRAMUSCULAR; INTRAVENOUS ONCE
Status: COMPLETED | OUTPATIENT
Start: 2022-09-14 | End: 2022-09-14

## 2022-09-14 RX ADMIN — DEXAMETHASONE SODIUM PHOSPHATE 4 MG: 100 INJECTION INTRAMUSCULAR; INTRAVENOUS at 12:09

## 2022-09-14 NOTE — PROGRESS NOTES
Subjective:       Patient ID: Maik Orr Jr. is a 27 y.o. male.    Vitals:  height is 6' (1.829 m) and weight is 87.5 kg (193 lb). His temperature is 97.7 °F (36.5 °C). His blood pressure is 136/92 (abnormal) and his pulse is 66. His respiration is 18 and oxygen saturation is 97%.     Chief Complaint: Cough    Pt complains x4 days of sore throat, cough, bodyache, headache, post nasal drip, nasal congestion.    Cough  This is a new problem. The current episode started in the past 7 days. The problem has been unchanged. The problem occurs constantly. The cough is Non-productive. Associated symptoms include headaches, nasal congestion, postnasal drip and a sore throat. Pertinent negatives include no chest pain, chills, ear congestion, ear pain, fever, heartburn, hemoptysis, myalgias, rash, rhinorrhea, shortness of breath, sweats, weight loss or wheezing.     Constitution: Negative for chills and fever.   HENT:  Positive for postnasal drip and sore throat. Negative for ear pain.    Cardiovascular:  Negative for chest pain.   Respiratory:  Positive for cough. Negative for bloody sputum, shortness of breath and wheezing.    Gastrointestinal:  Negative for heartburn.   Musculoskeletal:  Negative for muscle ache.   Skin:  Negative for rash and erythema.   Neurological:  Positive for headaches.     Objective:      Physical Exam   Constitutional: No distress. normal  HENT:   Head: Normocephalic and atraumatic.   Ears:   Right Ear: Tympanic membrane, external ear and ear canal normal. impacted cerumen  Left Ear: Tympanic membrane, external ear and ear canal normal. impacted cerumen  Nose: Rhinorrhea and congestion present.   Mouth/Throat: Mucous membranes are dry. Posterior oropharyngeal erythema present. No oropharyngeal exudate. Oropharynx is clear.   Eyes: Conjunctivae are normal. Pupils are equal, round, and reactive to light. Extraocular movement intact   Neck: Neck supple. No neck rigidity present.   Cardiovascular:  Normal rate, regular rhythm and normal pulses.   Pulmonary/Chest: Effort normal. No stridor. No respiratory distress. He has wheezes. He has no rhonchi. He exhibits no tenderness.   Abdominal: Normal appearance and bowel sounds are normal. Soft. flat abdomen   Musculoskeletal: Normal range of motion.         General: Normal range of motion.   Neurological: no focal deficit. He is at baseline. He is disoriented.   Skin: Skin is warm and dry. Capillary refill takes less than 2 seconds. No erythema   Psychiatric: His behavior is normal. Mood, judgment and thought content normal.   Nursing note and vitals reviewed.      Assessment:     URI  Sore throat  wheezing        Plan:       1. Cough  - POCT COVID-19 Rapid Screening    2. Sore throat  - POCT Strep A, Molecular  - POCT Influenza A/B MOLECULAR    3. Wheezing  - dexamethasone injection 4 mg        Covid, strep and flu all negative pt aware

## 2023-05-19 ENCOUNTER — PATIENT MESSAGE (OUTPATIENT)
Dept: PRIMARY CARE CLINIC | Facility: CLINIC | Age: 28
End: 2023-05-19
Payer: COMMERCIAL

## 2023-05-19 DIAGNOSIS — F41.8 SITUATIONAL ANXIETY: Primary | ICD-10-CM

## 2023-05-19 RX ORDER — ALPRAZOLAM 0.5 MG/1
TABLET ORAL
Qty: 8 TABLET | Refills: 0 | Status: SHIPPED | OUTPATIENT
Start: 2023-05-19

## 2023-06-12 ENCOUNTER — OFFICE VISIT (OUTPATIENT)
Dept: URGENT CARE | Facility: CLINIC | Age: 28
End: 2023-06-12
Payer: COMMERCIAL

## 2023-06-12 VITALS
DIASTOLIC BLOOD PRESSURE: 83 MMHG | TEMPERATURE: 98 F | SYSTOLIC BLOOD PRESSURE: 127 MMHG | RESPIRATION RATE: 14 BRPM | BODY MASS INDEX: 25.73 KG/M2 | WEIGHT: 190 LBS | HEIGHT: 72 IN | OXYGEN SATURATION: 98 % | HEART RATE: 49 BPM

## 2023-06-12 DIAGNOSIS — J36 TONSILLAR ABSCESS: ICD-10-CM

## 2023-06-12 DIAGNOSIS — J02.9 SORE THROAT: Primary | ICD-10-CM

## 2023-06-12 LAB
CTP QC/QA: YES
MOLECULAR STREP A: NEGATIVE

## 2023-06-12 PROCEDURE — 87651 POCT STREP A MOLECULAR: ICD-10-PCS | Mod: QW,S$GLB,, | Performed by: FAMILY MEDICINE

## 2023-06-12 PROCEDURE — 99213 PR OFFICE/OUTPT VISIT, EST, LEVL III, 20-29 MIN: ICD-10-PCS | Mod: 25,S$GLB,, | Performed by: FAMILY MEDICINE

## 2023-06-12 PROCEDURE — 87651 STREP A DNA AMP PROBE: CPT | Mod: QW,S$GLB,, | Performed by: FAMILY MEDICINE

## 2023-06-12 PROCEDURE — 96372 PR INJECTION,THERAP/PROPH/DIAG2ST, IM OR SUBCUT: ICD-10-PCS | Mod: S$GLB,,, | Performed by: FAMILY MEDICINE

## 2023-06-12 PROCEDURE — 96372 THER/PROPH/DIAG INJ SC/IM: CPT | Mod: S$GLB,,, | Performed by: FAMILY MEDICINE

## 2023-06-12 PROCEDURE — 99213 OFFICE O/P EST LOW 20 MIN: CPT | Mod: 25,S$GLB,, | Performed by: FAMILY MEDICINE

## 2023-06-12 RX ORDER — AMOXICILLIN 875 MG/1
875 TABLET, FILM COATED ORAL EVERY 12 HOURS
Qty: 20 TABLET | Refills: 0 | Status: SHIPPED | OUTPATIENT
Start: 2023-06-12 | End: 2023-07-11

## 2023-06-12 RX ORDER — DEXAMETHASONE SODIUM PHOSPHATE 100 MG/10ML
5 INJECTION INTRAMUSCULAR; INTRAVENOUS
Status: COMPLETED | OUTPATIENT
Start: 2023-06-12 | End: 2023-06-12

## 2023-06-12 RX ORDER — CEFTRIAXONE 250 MG/1
250 INJECTION, POWDER, FOR SOLUTION INTRAMUSCULAR; INTRAVENOUS
Status: COMPLETED | OUTPATIENT
Start: 2023-06-12 | End: 2023-06-12

## 2023-06-12 RX ADMIN — CEFTRIAXONE 250 MG: 250 INJECTION, POWDER, FOR SOLUTION INTRAMUSCULAR; INTRAVENOUS at 11:06

## 2023-06-12 RX ADMIN — DEXAMETHASONE SODIUM PHOSPHATE 5 MG: 100 INJECTION INTRAMUSCULAR; INTRAVENOUS at 11:06

## 2023-07-09 NOTE — PROGRESS NOTES
[facility] Note    Chief Complaint      Chief Complaint   Patient presents with    Annual Exam       History of Present Illness      Maik Orr Jr. is a 28 y.o.  Anxiety, DJD - L spine, Postlaminectomy Syndrome presents to fu for well visit. Referred by his Mom- Pati Orr. Last visit - 5/17/22     Hyperbilirubinemia -  2.3. This has been elevated in the past.  This is usually benign & not worrisome. It is common & can  elevate when you are dehydrated (as with fasting for lab work). It won't cause any problems. Sometimes this can be mildly elevated due to a genetic disorder called Gilbert's which does not require any intervention or treatment. I would just like to repeat this. If still elev consider RUQ U/S.      Leukopenia - White blood cell count was just below normal. We can repeat this with upcoming labs      Anxiety - Pt weaned off sertraline 50 mg/d x 2 wks. It caused some Dec libido.   He was on paxil in past. Had panic attacks in H.S. and was started on paxil and tried to wean off in [employer] and had panic attacks and restarted medication.  He tried gluten free diet - no help. No SI, no HI.  He does report 1-2 panic attacks that were unprovoked when he is lying in bad at night. Gets an electric jolt feeling when he first weaned - this has resolved. He prefers to try not to resume the Sertraline but is aware we can resume this again. He is planning to start easing back into an exercise regimen since he has been cleared by Ortho. He would like to see a therapist. He freq thinks about a falling out he had with 2 lifelong friends in 2021 and would like to discuss this in therapy. .      DJD L -spine -  Was working out 5 d/wk until he tore rotator cuff.  He is not on any meds for this. He gets injections prn.      Postlaminectomy Syndrome - Was working out 5 d/wk.  He is not on any meds for this. He gets injections prn.      Lumbar stenosis -  Was working out 5 d/wk.  He is not on any meds  for this. He gets injections prn.      Heart murmur -   Pt aware since childhood. Had prev stress Echo. EKG with Cards.      Left rotator cuff tear and labrum tear - s/p Left rotator cuff tear repair in Nov. [address].  Doing well.  Fu by Dr. Heath.      Concentration deficit - Pt has never been formally tested. Rec he do so. He took 5 mg 1-2/wk since College. He is a  and it helps him focus.      H/o COVID - 19 - 7/8/20 - no lingering effects.      HCM - Flu - 10/26/22;  Tdap -8/4/10 - due;  Gardasil - 7/9/12;  Hep C Screen - utd - neg;  HIV Screen - utd - neg; C-scope - none;  Prev PCP -Dr. Folrian Schaefer at [facility] and saw Dr. Fulton at [facility] once; Ortho - Dr. Yeung; Pain Mgmnt - ?; Ortho - Dr. Norman/Ivana;CRS - Dr. Robert    Patient Care Team:  Becca Bynum MD as PCP - General (Internal Medicine)  Milvia Fierro MA as Care Coordinator     Health Maintenance:  Immunization History   Administered Date(s) Administered    DTaP 1994, 1994, 01/15/1995, 01/03/1996, 07/13/1999    HIB 1994, 1994, 01/05/1995, 10/12/1995    HPV Quadrivalent 07/09/2012    Hepatitis B, Pediatric/Adolescent 1994, 1994, 04/03/1995    IPV 1994, 1994, 01/03/1996, 07/13/1999    Influenza - Intranasal 10/02/2007    Influenza - Quadrivalent - MDCK - PF 10/30/2017, 11/13/2019    Influenza - Quadrivalent - PF *Preferred* (6 months and older) 10/16/2002, 10/15/2003, 10/08/2004, 10/26/2005, 10/20/2006, 09/16/2009, 10/20/2014, 10/05/2015, 10/12/2016, 11/09/2020, 10/26/2022    Influenza A (H1N1) 2009 Monovalent - IM 11/03/2009    MMR 10/12/1995, 07/13/1999    Meningococcal Conjugate (MCV4P) 08/02/2005, 07/09/2012    PPD Test 07/07/1995    Td (Adult), Unspecified Formulation 08/02/2005    Tdap 08/04/2010    Varicella 03/01/2007, 07/08/2009      Health Maintenance   Topic Date Due    TETANUS VACCINE  08/04/2020    Hepatitis C Screening  Completed    Lipid Panel  Completed        Past  Medical History:  Past Medical History:   Diagnosis Date    Anxiety        Past Surgical History:   has a past surgical history that includes Lumbar discectomy; Foraminotomy; Laminectomy; Epidural steroid injection into lumbar spine; Shoulder arthroscopy w/ rotator cuff repair (Left); and Shoulder arthroscopy w/ labral repair (Left).    Family History:  family history includes Bladder Cancer in his maternal grandmother; Lung cancer in his paternal grandfather; Ovarian cancer in his maternal grandmother; Pancreatitis in his father; Suicidality in his maternal grandfather.     Social History:  Social History     Tobacco Use    Smoking status: Some Days     Types: Vaping with nicotine    Smokeless tobacco: Never    Tobacco comments:     Nicotine gum/pouch and vape on occasion   Substance Use Topics    Alcohol use: Yes     Comment: 5-8 drinks - x 3 d/wk    Drug use: Yes     Types: Marijuana     Comment: rare       Review of Systems   Constitutional:  Negative for chills, diaphoresis and fever.   HENT:  Negative for nasal congestion and sore throat.    Respiratory:  Negative for cough.    Cardiovascular:  Positive for chest pain and palpitations.        Only with Panic attack   Gastrointestinal:  Positive for nausea. Negative for abdominal pain, blood in stool, constipation, diarrhea and vomiting.   Endocrine: Negative for cold intolerance, heat intolerance and polydipsia.   Genitourinary:  Negative for dysuria.        No hesitancy and no nocturia.     Musculoskeletal:  Negative for arthralgias and myalgias.   Psychiatric/Behavioral:  Negative for dysphoric mood. The patient is nervous/anxious.       Medications:    Current Outpatient Medications:     multivitamin (ONE DAILY MULTIVITAMIN) per tablet, Take 1 tablet by mouth once daily., Disp: , Rfl:     ALPRAZolam (XANAX) 0.5 MG tablet, 1 po 30-60 min prior to flight (Patient not taking: Reported on 6/12/2023), Disp: 8 tablet, Rfl: 0    FLUARIX QUAD [id number], PF, 60 mcg  (15 mcg x 4)/0.5 mL Syrg, , Disp: , Rfl:      Allergies:  Review of patient's allergies indicates:  No Known Allergies    Physical Exam      Vital Signs  Temp: 98.1 °F (36.7 °C)  Temp Source: Oral  Pulse: 63  Resp: 12  SpO2: 98 %  BP: 120/76  BP Location: Left arm  Patient Position: Sitting  Pain Score: 0-No pain  Height and Weight  Height: 6' (182.9 cm)  Weight: 86.7 kg (191 lb 2.2 oz)  BSA (Calculated - sq m): 2.1 sq meters  BMI (Calculated): 25.9  Weight in (lb) to have BMI = 25: 183.9      Patient Position: Sitting      Physical Exam  Vitals reviewed.   Constitutional:       General: He is not in acute distress.     Appearance: Normal appearance. He is not ill-appearing, toxic-appearing or diaphoretic.   HENT:      Head: Normocephalic and atraumatic.      Right Ear: Tympanic membrane normal.      Left Ear: Tympanic membrane normal.      Mouth/Throat:      Mouth: Mucous membranes are moist.      Pharynx: No oropharyngeal exudate or posterior oropharyngeal erythema.   Eyes:      Extraocular Movements: Extraocular movements intact.      Conjunctiva/sclera: Conjunctivae normal.      Pupils: Pupils are equal, round, and reactive to light.   Neck:      Vascular: No carotid bruit.   Cardiovascular:      Rate and Rhythm: Normal rate and regular rhythm.      Pulses: Normal pulses.      Heart sounds: Murmur heard.      Comments: II/VI HSM radiates to sternal notch and carotid  Pulmonary:      Effort: No respiratory distress.      Breath sounds: Normal breath sounds. No wheezing.   Abdominal:      General: Bowel sounds are normal. There is no distension.      Palpations: Abdomen is soft.      Tenderness: There is no abdominal tenderness. There is no guarding or rebound.   Musculoskeletal:         General: Normal range of motion.      Cervical back: Neck supple. No tenderness.   Skin:     General: Skin is warm and dry.   Neurological:      General: No focal deficit present.      Mental Status: He is alert and oriented to  person, place, and time.   Psychiatric:         Mood and Affect: Mood normal.         Behavior: Behavior normal.        Laboratory:  CBC:  Recent Labs   Lab 02/03/21  0816   WBC 3.77 L   RBC 4.88   Hemoglobin 14.6   Hematocrit 44.0   Platelets 255   MCV 90   MCH 29.9   MCHC 33.2       CMP:  Recent Labs   Lab 02/03/21  0816   Glucose 73   Calcium 10.2   Albumin 4.8   Total Protein 7.3   Sodium 140   Potassium 4.0   CO2 23   Chloride 103   BUN 13   Creatinine 1.0   Alkaline Phosphatase 52 L   ALT 17   AST 25   Total Bilirubin 2.3 H           LIPIDS:  Recent Labs   Lab 02/03/21  0816   TSH 2.265   HDL 54   Cholesterol 186   Triglycerides 99   LDL Cholesterol 112.2   HDL/Cholesterol Ratio 29.0   Non-HDL Cholesterol 132   Total Cholesterol/HDL Ratio 3.4       TSH:  Recent Labs   Lab 02/03/21  0816   TSH 2.265          Recent Labs   Lab 02/03/21  0816   Hepatitis C Ab Negative       Assessment/Plan     Maik Orr Jr. is a 28 y.o.male with:    Normal physical exam, routine  -     Basic Metabolic Panel; Future; Expected date: 07/11/2023  -     CBC Auto Differential; Future; Expected date: 07/11/2023  -     Hepatic Function Panel; Future; Expected date: 07/11/2023  -     TSH; Future; Expected date: 07/11/2023  - Performed today.  Will check Basic labs.  RTC in 1 yr for fu or sooner if needed    Anxiety  -     Ambulatory referral/consult to Psychology; Future; Expected date: 07/18/2023  - Refer to Psychology for therapy. Pt planning to resume exercise. Cont to monitor off pharmacotherapy and if feels this is worsening off meds will fu virtually to discuss options.    Leukopenia, unspecified type  - Repeat CBC.     Hyperbilirubinemia  - check fractionated bili. Consider RUQ U/S if still elev.     High risk sexual behavior, unspecified type  -     Hepatitis Panel, Acute; Future; Expected date: 07/11/2023  -     HIV 1/2 Ag/Ab (4th Gen); Future; Expected date: 07/11/2023  -     RPR; Future; Expected date: 07/11/2023  -      HERPES SIMPLEX 1&2 IGG; Future; Expected date: 07/11/2023  -     C. trachomatis/N. gonorrhoeae by AMP DNA [facility]; Urine; Future; Expected date: 01/11/2024  -     SureSwab(R)Chlamydia/N.Gonorrhoeae RNA,TMA; Future; Expected date: 07/11/2023    Screening for lipoid disorders  -     Lipid Panel; Future; Expected date: 07/11/2023      Chronic conditions status updated as per HPI.  Other than changes above, cont current medications and maintain follow up with specialists.   Follow up in about 1 year (around 7/11/2024) for well visit or sooner if needed.      Nicole Giambrone, MD [facility]

## 2023-07-11 ENCOUNTER — OFFICE VISIT (OUTPATIENT)
Dept: PRIMARY CARE CLINIC | Facility: CLINIC | Age: 28
End: 2023-07-11
Payer: COMMERCIAL

## 2023-07-11 VITALS
DIASTOLIC BLOOD PRESSURE: 76 MMHG | RESPIRATION RATE: 12 BRPM | WEIGHT: 191.13 LBS | BODY MASS INDEX: 25.89 KG/M2 | TEMPERATURE: 98 F | HEART RATE: 63 BPM | HEIGHT: 72 IN | SYSTOLIC BLOOD PRESSURE: 120 MMHG | OXYGEN SATURATION: 98 %

## 2023-07-11 DIAGNOSIS — F41.9 ANXIETY: ICD-10-CM

## 2023-07-11 DIAGNOSIS — D72.819 LEUKOPENIA, UNSPECIFIED TYPE: ICD-10-CM

## 2023-07-11 DIAGNOSIS — Z72.51 HIGH RISK SEXUAL BEHAVIOR, UNSPECIFIED TYPE: ICD-10-CM

## 2023-07-11 DIAGNOSIS — E80.6 HYPERBILIRUBINEMIA: ICD-10-CM

## 2023-07-11 DIAGNOSIS — Z00.00 NORMAL PHYSICAL EXAM, ROUTINE: Primary | ICD-10-CM

## 2023-07-11 DIAGNOSIS — Z13.220 SCREENING FOR LIPOID DISORDERS: ICD-10-CM

## 2023-07-11 PROCEDURE — 1159F MED LIST DOCD IN RCRD: CPT | Mod: CPTII,S$GLB,, | Performed by: INTERNAL MEDICINE

## 2023-07-11 PROCEDURE — 99999 PR PBB SHADOW E&M-EST. PATIENT-LVL IV: ICD-10-PCS | Mod: PBBFAC,,, | Performed by: INTERNAL MEDICINE

## 2023-07-11 PROCEDURE — 3008F PR BODY MASS INDEX (BMI) DOCUMENTED: ICD-10-PCS | Mod: CPTII,S$GLB,, | Performed by: INTERNAL MEDICINE

## 2023-07-11 PROCEDURE — 99395 PR PREVENTIVE VISIT,EST,18-39: ICD-10-PCS | Mod: S$GLB,,, | Performed by: INTERNAL MEDICINE

## 2023-07-11 PROCEDURE — 3078F DIAST BP <80 MM HG: CPT | Mod: CPTII,S$GLB,, | Performed by: INTERNAL MEDICINE

## 2023-07-11 PROCEDURE — 3074F PR MOST RECENT SYSTOLIC BLOOD PRESSURE < 130 MM HG: ICD-10-PCS | Mod: CPTII,S$GLB,, | Performed by: INTERNAL MEDICINE

## 2023-07-11 PROCEDURE — 1159F PR MEDICATION LIST DOCUMENTED IN MEDICAL RECORD: ICD-10-PCS | Mod: CPTII,S$GLB,, | Performed by: INTERNAL MEDICINE

## 2023-07-11 PROCEDURE — 1160F RVW MEDS BY RX/DR IN RCRD: CPT | Mod: CPTII,S$GLB,, | Performed by: INTERNAL MEDICINE

## 2023-07-11 PROCEDURE — 3008F BODY MASS INDEX DOCD: CPT | Mod: CPTII,S$GLB,, | Performed by: INTERNAL MEDICINE

## 2023-07-11 PROCEDURE — 3078F PR MOST RECENT DIASTOLIC BLOOD PRESSURE < 80 MM HG: ICD-10-PCS | Mod: CPTII,S$GLB,, | Performed by: INTERNAL MEDICINE

## 2023-07-11 PROCEDURE — 99395 PREV VISIT EST AGE 18-39: CPT | Mod: S$GLB,,, | Performed by: INTERNAL MEDICINE

## 2023-07-11 PROCEDURE — 99999 PR PBB SHADOW E&M-EST. PATIENT-LVL IV: CPT | Mod: PBBFAC,,, | Performed by: INTERNAL MEDICINE

## 2023-07-11 PROCEDURE — 1160F PR REVIEW ALL MEDS BY PRESCRIBER/CLIN PHARMACIST DOCUMENTED: ICD-10-PCS | Mod: CPTII,S$GLB,, | Performed by: INTERNAL MEDICINE

## 2023-07-11 PROCEDURE — 3074F SYST BP LT 130 MM HG: CPT | Mod: CPTII,S$GLB,, | Performed by: INTERNAL MEDICINE

## 2023-07-11 RX ORDER — MULTIVITAMIN
1 TABLET ORAL DAILY
COMMUNITY

## 2023-08-15 LAB
2ND TRIMESTER 4 SCREEN SERPL-IMP: NORMAL
ABSOLUTE PLASMA CELLS: NORMAL
ACANTHOCYTES: NORMAL
ATYPICAL LYMPHOCYTE RELATIVE PERCENT: NORMAL
AUER BODIES BLD QL SMEAR: NORMAL
BANDS ABSOLUTE: NORMAL
BASO STIP: NORMAL
BASOPHILS ABSOLUTE COUNT: NORMAL
BASOPHILS NFR BLD AUTO: NORMAL %
BLAST CELLS ABSOLUTE COUNT: NORMAL
BLASTS %: NORMAL
BURR CELLS BLD QL SMEAR: NORMAL
CRENATED RBC'S: NORMAL
DIFFERENTIAL COMMENT: NORMAL
DOHLE BOD BLD QL SMEAR: NORMAL
EOSINOPHIL NFR BLD AUTO: NORMAL %
EOSINOPHILS ABSOLUTE COUNT: NORMAL
GIANT PLATELETS BLD QL SMEAR: NORMAL
HOWELL-JOLLY BOD BLD QL SMEAR: NORMAL
HYPERSEG PMN: NORMAL
HYPO: NORMAL
IMM MONOCLEAR CELLS RELATIVE: NORMAL
IMM MONONUCLEAR CELLS ABSOLUTE: NORMAL
LYMPHOCYTES ABSOLUTE COUNT: NORMAL
LYMPHOCYTES NFR BLD AUTO: NORMAL %
LYMPHOCYTES RELATIVE PERCENT: NORMAL
MACROCYTOSIS: NORMAL
METAMYELOCYTE, ABS: NORMAL
METAMYELOCYTES PERCENT: NORMAL
MICROCYTOSIS: NORMAL
MONOCYTES ABSOLUTE COUNT: NORMAL
MONOCYTES RELATIVE: NORMAL
MYELOCYTE ABSOLUTE COUNT: NORMAL
NEUTROPHILS ABSOLUTE COUNT: NORMAL
NEUTROPHILS RELATIVE PERCENT: NORMAL
NUCLEATED RBCS: NORMAL
OVALOCYTES BLD QL SMEAR: NORMAL
PAPPENHEIMER BOD BLD QL SMEAR: NORMAL
PATHOLOGIST REVIEW: NORMAL
PLASMA CELLS: NORMAL
PLATELET CLUMPS: NORMAL
POIKILOCYTOSIS BLD QL SMEAR: NORMAL
POLY: NORMAL
PROMYELOCYTES ABSOLUTE COUNT: NORMAL
PROMYELOCYTES RELATIVE PERCENT: NORMAL
RED CELL MORPHOLOGY CMT: NORMAL
ROULEAUX BLD QL SMEAR: NORMAL
SCHISTOCYTES: NORMAL
SICKLE CELLS: NORMAL
SMALL PLATELETS BLD QL SMEAR: NORMAL
SMUDGE CELLS BLD QL SMEAR: NORMAL
SPHEROCYTES BLD QL SMEAR: NORMAL
STOMATOCYTES BLD QL SMEAR: NORMAL
TARGETS BLD QL SMEAR: NORMAL
TEAR DROP CELLS: NORMAL
TETRACYCLINE TITR SBT: NORMAL {TITER}
TOBRAMYCIN ISLT MIC: NORMAL
TOXIC GRANULES BLD QL SMEAR: NORMAL
VACUOLATED PMNS: NORMAL
WBC MORPHOLOGY: NORMAL

## 2023-08-16 DIAGNOSIS — D72.819 LEUKOPENIA, UNSPECIFIED TYPE: Primary | ICD-10-CM

## 2023-08-16 LAB
ALBUMIN: 4.5 G/DL (ref 3.5–5)
ALP SERPL-CCNC: 54 U/L (ref 38–126)
ALT SERPL W P-5'-P-CCNC: 15 U/L (ref 7–56)
ANION GAP SERPL CALC-SCNC: 14.9 MMOL/L (ref 9–18)
AST SERPL-CCNC: 21 U/L (ref 7–40)
BASOPHILS ABSOLUTE COUNT: 0 THOUSAND/UL (ref 0–0.2)
BASOPHILS NFR BLD: 0.9 % (ref 0–2)
BILIRUB CONJ+UNCONJ SERPL-MCNC: 1 MG/DL
BILIRUB SERPL-MCNC: 1.2 MG/DL (ref 0–1.2)
BILIRUBIN DIRECT+TOT PNL SERPL-MCNC: 0.2 MG/DL (ref 0–0.3)
BUN BLD-MCNC: 16 MG/DL (ref 7–21)
BUN/CREAT SERPL: 17 (ref 6–22)
CALC OSMOLALITY: 277 MOSM/KG (ref 275–295)
CALCIUM SERPL-MCNC: 10.5 MG/DL (ref 8.5–10.5)
CHLORIDE SERPL-SCNC: 101 MMOL/L (ref 98–107)
CHOL/HDLC RATIO: 3.32
CHOLEST SERPL-MSCNC: 189 MG/DL (ref 100–160)
CO2 SERPL-SCNC: 27 MMOL/L (ref 21–31)
COMMENT: NORMAL
CREAT SERPL-MCNC: 0.95 MG/DL (ref 0.7–1.2)
EGFR: 111 ML/MIN/1.73M2
EOSINOPHIL NFR BLD: 2.7 % (ref 0–4)
EOSINOPHILS ABSOLUTE COUNT: 0.1 THOUSAND/UL (ref 0–0.7)
ERYTHROCYTE [DISTWIDTH] IN BLOOD BY AUTOMATED COUNT: 12.3 % (ref 12–15.3)
GLUCOSE SERPL-MCNC: 100 MG/DL (ref 70–100)
HAV IGM SERPL QL IA: NORMAL
HBV CORE IGM SERPL QL IA: NORMAL
HBV SURFACE AG SERPL QL IA: NORMAL
HCT VFR BLD AUTO: 43.3 % (ref 40–52)
HCV AB SERPL QL IA: NORMAL
HDLC SERPL-MCNC: 57 MG/DL (ref 40–75)
HGB BLD-MCNC: 14.8 GM/DL (ref 13.6–17.5)
LDLC SERPL CALC-MCNC: 109 MG/DL (ref 0–95)
LYMPHOCYTES %: 38.4 % (ref 15–45)
LYMPHOCYTES ABSOLUTE COUNT: 1.6 THOUSAND/UL (ref 1–4.2)
MCH RBC QN AUTO: 30.6 PG (ref 27–33)
MCHC RBC AUTO-ENTMCNC: 34.2 G/DL (ref 32–36)
MCV RBC AUTO: 89.4 FL (ref 80–94)
MONOCYTES %: 8.2 % (ref 3–13)
MONOCYTES ABSOLUTE COUNT: 0.3 THOUSAND/UL (ref 0.1–0.8)
NEUTROPHILS ABSOLUTE COUNT: 2 THOUSAND/UL (ref 2.1–7.6)
NEUTROPHILS RELATIVE PERCENT: 49.8 % (ref 32–80)
PLATELET # BLD AUTO: 224 THOUSAND/UL (ref 150–350)
PMV BLD AUTO: 9.4 FL (ref 7–10.2)
POTASSIUM SERPL-SCNC: 4.9 MMOL/L (ref 3.5–5)
RBC # BLD AUTO: 4.85 MILLION/UL (ref 4.45–5.9)
SODIUM BLD-SCNC: 138 MMOL/L (ref 135–145)
TOTAL PROTEIN: 6.6 G/DL (ref 6.3–8.2)
TRIGL SERPL-MCNC: 145 MG/DL (ref 30–150)
TSH SERPL DL<=0.005 MIU/L-ACNC: 3.47 UIU/ML (ref 0.35–4)
WBC # BLD AUTO: 4.1 THOUSAND/UL (ref 4.5–11)

## 2023-08-16 NOTE — PROGRESS NOTES
I sent pt a my chart message -  I reviewed your labs.  Your thyroid functions are normal.  Your Cholesterol looked good despite it being flagged as high.  Your kidney function and liver functions looked good.  You are not anemic. Your White blood cell count was slightly low. We can just repeat this in 6 wks.  No further recommendations at this time.    Dr. RAMOS

## 2023-08-22 ENCOUNTER — PATIENT MESSAGE (OUTPATIENT)
Dept: PRIMARY CARE CLINIC | Facility: CLINIC | Age: 28
End: 2023-08-22
Payer: COMMERCIAL

## 2023-08-22 NOTE — TELEPHONE ENCOUNTER
Lov 7/11/23  Will help pt schedule an appt for eval. Would we just start with an available internal med provider to access first?

## 2023-08-22 NOTE — TELEPHONE ENCOUNTER
He should be seen today as he may likely need antibiotics. Any rash or other symptoms such as Joint pain, weakness, fatigue, Headaches, fever, neck stiffness, dec appetite,  etc? Need to make sure he does not have a cellulitis in the area of the tick bite jeanette given the location.ot that he has enlarged Lymph nodes that could be an indication of Early lyme disease.      Dr. RAMOS

## 2023-08-23 ENCOUNTER — OFFICE VISIT (OUTPATIENT)
Dept: PRIMARY CARE CLINIC | Facility: CLINIC | Age: 28
End: 2023-08-23
Payer: COMMERCIAL

## 2023-08-23 VITALS
WEIGHT: 198.44 LBS | OXYGEN SATURATION: 98 % | DIASTOLIC BLOOD PRESSURE: 74 MMHG | HEIGHT: 72 IN | TEMPERATURE: 99 F | BODY MASS INDEX: 26.88 KG/M2 | HEART RATE: 77 BPM | SYSTOLIC BLOOD PRESSURE: 130 MMHG
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
## 2023-08-23 DIAGNOSIS — S00.06XA TICK BITE OF SCALP, INITIAL ENCOUNTER: Primary | ICD-10-CM

## 2023-08-23 PROCEDURE — 99214 OFFICE O/P EST MOD 30 MIN: CPT | Mod: S$GLB,,, | Performed by: STUDENT IN AN ORGANIZED HEALTH CARE EDUCATION/TRAINING PROGRAM

## 2023-08-23 PROCEDURE — 99999 PR PBB SHADOW E&M-EST. PATIENT-LVL III: CPT | Mod: PBBFAC,,, | Performed by: STUDENT IN AN ORGANIZED HEALTH CARE EDUCATION/TRAINING PROGRAM

## 2023-08-23 PROCEDURE — 3078F DIAST BP <80 MM HG: CPT | Mod: CPTII,S$GLB,, | Performed by: STUDENT IN AN ORGANIZED HEALTH CARE EDUCATION/TRAINING PROGRAM

## 2023-08-23 PROCEDURE — 99214 PR OFFICE/OUTPT VISIT, EST, LEVL IV, 30-39 MIN: ICD-10-PCS | Mod: S$GLB,,, | Performed by: STUDENT IN AN ORGANIZED HEALTH CARE EDUCATION/TRAINING PROGRAM

## 2023-08-23 PROCEDURE — 3075F SYST BP GE 130 - 139MM HG: CPT | Mod: CPTII,S$GLB,, | Performed by: STUDENT IN AN ORGANIZED HEALTH CARE EDUCATION/TRAINING PROGRAM

## 2023-08-23 PROCEDURE — 3008F PR BODY MASS INDEX (BMI) DOCUMENTED: ICD-10-PCS | Mod: CPTII,S$GLB,, | Performed by: STUDENT IN AN ORGANIZED HEALTH CARE EDUCATION/TRAINING PROGRAM

## 2023-08-23 PROCEDURE — 1159F PR MEDICATION LIST DOCUMENTED IN MEDICAL RECORD: ICD-10-PCS | Mod: CPTII,S$GLB,, | Performed by: STUDENT IN AN ORGANIZED HEALTH CARE EDUCATION/TRAINING PROGRAM

## 2023-08-23 PROCEDURE — 3008F BODY MASS INDEX DOCD: CPT | Mod: CPTII,S$GLB,, | Performed by: STUDENT IN AN ORGANIZED HEALTH CARE EDUCATION/TRAINING PROGRAM

## 2023-08-23 PROCEDURE — 1159F MED LIST DOCD IN RCRD: CPT | Mod: CPTII,S$GLB,, | Performed by: STUDENT IN AN ORGANIZED HEALTH CARE EDUCATION/TRAINING PROGRAM

## 2023-08-23 PROCEDURE — 3078F PR MOST RECENT DIASTOLIC BLOOD PRESSURE < 80 MM HG: ICD-10-PCS | Mod: CPTII,S$GLB,, | Performed by: STUDENT IN AN ORGANIZED HEALTH CARE EDUCATION/TRAINING PROGRAM

## 2023-08-23 PROCEDURE — 99999 PR PBB SHADOW E&M-EST. PATIENT-LVL III: ICD-10-PCS | Mod: PBBFAC,,, | Performed by: STUDENT IN AN ORGANIZED HEALTH CARE EDUCATION/TRAINING PROGRAM

## 2023-08-23 PROCEDURE — 3075F PR MOST RECENT SYSTOLIC BLOOD PRESS GE 130-139MM HG: ICD-10-PCS | Mod: CPTII,S$GLB,, | Performed by: STUDENT IN AN ORGANIZED HEALTH CARE EDUCATION/TRAINING PROGRAM

## 2023-08-23 RX ORDER — AMOXICILLIN 875 MG/1
875 TABLET, FILM COATED ORAL
COMMUNITY
End: 2024-02-29

## 2023-08-23 RX ORDER — DOXYCYCLINE 100 MG/1
100 CAPSULE ORAL 2 TIMES DAILY
Qty: 20 CAPSULE | Refills: 0 | Status: SHIPPED | OUTPATIENT
Start: 2023-08-23 | End: 2023-09-02

## 2023-08-23 NOTE — PROGRESS NOTES
Primary Care  Return/Acute Office Visit - In Person  8/23/2023  Omid Ndhlovu      [facility]    Patient is a 28 y.o.   Maik Orr Jr.  has a past medical history of Anxiety.    Patient presents with   Chief Complaint   Patient presents with    Tick Removal     Discovered On Saturday in the scalp. Currently taking Amox 875 mg.      Patient reports experiencing headache which began on Friday   The following day he removed a tick from the left side of his scalp   Since then he has has been experiencing worsening headache accompanied by swelling on the right side of scalp        Social History     Social History Narrative    Not on file     Miak Orr Jr. family history includes Bladder Cancer in his maternal grandmother; Lung cancer in his paternal grandfather; Ovarian cancer in his maternal grandmother; Pancreatitis in his father; Suicidality in his maternal grandfather.    Active Medications:  Review of patient's allergies indicates:  No Known Allergies  Current Outpatient Medications   Medication Instructions    ALPRAZolam (XANAX) 0.5 MG tablet 1 po 30-60 min prior to flight    amoxicillin (AMOXIL) 875 mg, Oral, Every 12 hours (non-standard times)    doxycycline (VIBRAMYCIN) 100 mg, Oral, 2 times daily    FLUARIX QUAD [id number], PF, 60 mcg (15 mcg x 4)/0.5 mL Syrg No dose, route, or frequency recorded.    multivitamin (ONE DAILY MULTIVITAMIN) per tablet 1 tablet, Oral, Daily       Review of Systems   All other systems reviewed and are negative.      Vitals:    08/23/23 1507   BP: 130/74   BP Location: Left arm   Pulse: 77   Temp: 98.5 °F (36.9 °C)   SpO2: 98%   Weight: 90 kg (198 lb 6.6 oz)   Height: 6' (1.829 m)       Physical Exam  Skin:     Comments: No rashes no erythema  No residue of tick examined on scalp        Assessment and Plan     Concern for tick-borne illness  No rashes or erythema at site  Recommended ice application and ibuprofen for swelling and pain  Doxycycline for 10 days  Patient to follow-up if  developing rash or fever              Upcoming Scheduled Appointments and Follow Up:    No future appointments.    Follow Up [facility] (with who? when?): No follow-ups on file.      Extended Emergency Contact Information  Primary Emergency Contact: Maik Orr  Mobile Phone: [phone]  Relation: Father      Omid Garcia MD   Attending Physician  Primary Care  8/23/2023 - 3:15 PM    I spent a total of 26 minutes on the day of the visit.This includes face to face time and non-face to face time preparing to see the patient (eg, review of tests), obtaining and/or reviewing separately obtained history, documenting clinical information in the electronic or other health record, independently interpreting results and communicating results to the patient/family/caregiver, or care coordinator.

## 2024-02-29 ENCOUNTER — OFFICE VISIT (OUTPATIENT)
Dept: PRIMARY CARE CLINIC | Facility: CLINIC | Age: 29
End: 2024-02-29
Payer: COMMERCIAL

## 2024-02-29 ENCOUNTER — PATIENT MESSAGE (OUTPATIENT)
Dept: PRIMARY CARE CLINIC | Facility: CLINIC | Age: 29
End: 2024-02-29
Payer: COMMERCIAL

## 2024-02-29 DIAGNOSIS — L02.416 ABSCESS OF LEFT THIGH: Primary | ICD-10-CM

## 2024-02-29 PROCEDURE — 1159F MED LIST DOCD IN RCRD: CPT | Mod: CPTII,95,, | Performed by: INTERNAL MEDICINE

## 2024-02-29 PROCEDURE — 99214 OFFICE O/P EST MOD 30 MIN: CPT | Mod: 95,,, | Performed by: INTERNAL MEDICINE

## 2024-02-29 PROCEDURE — 1160F RVW MEDS BY RX/DR IN RCRD: CPT | Mod: CPTII,95,, | Performed by: INTERNAL MEDICINE

## 2024-02-29 RX ORDER — MUPIROCIN 20 MG/G
OINTMENT TOPICAL
Qty: 22 G | Refills: 0 | Status: SHIPPED | OUTPATIENT
Start: 2024-02-29 | End: 2024-06-04

## 2024-02-29 RX ORDER — DOXYCYCLINE HYCLATE 100 MG
100 TABLET ORAL 2 TIMES DAILY
Qty: 20 TABLET | Refills: 0 | Status: SHIPPED | OUTPATIENT
Start: 2024-02-29 | End: 2024-03-10

## 2024-02-29 NOTE — PROGRESS NOTES
"[facility] Note    Chief Complaint    No chief complaint on file.      History of Present Illness      Maik Orr Jr. is a 28 y.o.  Anxiety, DJD - L spine, Postlaminectomy Syndrome presents to fu same day visit for painful lesion to Left thigh that has inc in size. Referred by his Mom- Pati Orr. Last visit - 7/11/23    The patient location is: work  The chief complaint leading to consultation is: "Abscess to Left thigh x 1 d progressively worsening and painful"    Visit type: audiovisual    Face to Face time with patient: 17 min.  17 minutes of total time spent on the encounter, which includes face to face time and non-face to face time preparing to see the patient (eg, review of tests), Obtaining and/or reviewing separately obtained history, Documenting clinical information in the electronic or other health record, Independently interpreting results (not separately reported) and communicating results to the patient/family/caregiver, or Care coordination (not separately reported).         Each patient to whom he or she provides medical services by telemedicine is:  (1) informed of the relationship between the physician and patient and the respective role of any other health care provider with respect to management of the patient; and (2) notified that he or she may decline to receive medical services by telemedicine and may withdraw from such care at any time.    Notes:       Abscess to Left thigh x 1 d - It is  progressively worsening and painful. He felt a bump on his leg when he was going to dinner last night. He awakened and noticed it was painful this AM.  It had a pustule. He squeezed this AM and did not express any exudate.  It has been increasing in size and is tender.  He reports he has not tolerated bactrim in the past "it caused swelling and discomfort down below". Not on recent Abx in the past 3 mos.  No prev abscess.  Will Rx Doxycycline which he has prev. Tolerated. Rec warm " compresses. Will also Rx Mupirocin for nares. Alert MD if Sx's worsen. Rec Ibuprofen or tylenol prn pain. He did draw around the area of erythema to see if worsening.      Patient Care Team:  Becca Bynum MD as PCP - General (Internal Medicine)  Milvia Fierro MA as Care Coordinator     Health Maintenance:  Immunization History   Administered Date(s) Administered    DTaP 1994, 1994, 01/15/1995, 01/03/1996, 07/13/1999    HIB 1994, 1994, 01/05/1995, 10/12/1995    HPV Quadrivalent 07/09/2012    Hepatitis B, Pediatric/Adolescent 1994, 1994, 04/03/1995    IPV 1994, 1994, 01/03/1996, 07/13/1999    Influenza - Intranasal 10/02/2007    Influenza - Quadrivalent - [facility] - PF 10/30/2017, 11/13/2019    Influenza - Quadrivalent - PF *Preferred* (6 months and older) 10/16/2002, 10/15/2003, 10/08/2004, 10/26/2005, 10/20/2006, 09/16/2009, 10/20/2014, 10/05/2015, 10/12/2016, 11/09/2020, 10/26/2022    Influenza A (H1N1) 2009 Monovalent - IM 11/03/2009    MMR 10/12/1995, 07/13/1999    Meningococcal Conjugate (MCV4P) 08/02/2005, 07/09/2012    PPD Test 07/07/1995    Td (Adult), Unspecified Formulation 08/02/2005    Tdap 08/04/2010    Varicella 03/01/2007, 07/08/2009      Health Maintenance   Topic Date Due    TETANUS VACCINE  08/04/2020    Hepatitis C Screening  Completed    Lipid Panel  Completed        Past Medical History:  Past Medical History:   Diagnosis Date    Anxiety        Past Surgical History:   has a past surgical history that includes Lumbar discectomy; Foraminotomy; Laminectomy; Epidural steroid injection into lumbar spine; Shoulder arthroscopy w/ rotator cuff repair (Left); and Shoulder arthroscopy w/ labral repair (Left).    Family History:  family history includes Bladder Cancer in his maternal grandmother; Lung cancer in his paternal grandfather; Ovarian cancer in his maternal grandmother; Pancreatitis in his father; Suicidality in his maternal grandfather.      Social History:  Social History     Tobacco Use    Smoking status: Some Days     Types: Vaping with nicotine    Smokeless tobacco: Never    Tobacco comments:     Nicotine gum/pouch and vape on occasion   Substance Use Topics    Alcohol use: Yes     Comment: 5-8 drinks - x 3 d/wk    Drug use: Yes     Types: Marijuana     Comment: rare       Review of Systems   Constitutional:  Negative for chills, fatigue and fever.   HENT:  Negative for nasal congestion, rhinorrhea and sore throat.    Eyes:  Negative for pain.   Respiratory:  Negative for cough and shortness of breath.    Gastrointestinal:  Negative for abdominal pain, diarrhea, nausea and vomiting.   Integumentary:  Positive for rash.        Medications:    Current Outpatient Medications:     ALPRAZolam (XANAX) 0.5 MG tablet, 1 po 30-60 min prior to flight, Disp: 8 tablet, Rfl: 0    amoxicillin (AMOXIL) 875 MG tablet, Take 875 mg by mouth every 12 (twelve) hours., Disp: , Rfl:     doxycycline (VIBRA-TABS) 100 MG tablet, Take 1 tablet (100 mg total) by mouth 2 (two) times daily. for 10 days, Disp: 20 tablet, Rfl: 0    FLUARIX QUAD [id number], PF, 60 mcg (15 mcg x 4)/0.5 mL Syrg, , Disp: , Rfl:     multivitamin (ONE DAILY MULTIVITAMIN) per tablet, Take 1 tablet by mouth once daily., Disp: , Rfl:     mupirocin (BACTROBAN) 2 % ointment, Apply yo nares BID x 5 days, Disp: 22 g, Rfl: 0     Allergies:  Review of patient's allergies indicates:   Allergen Reactions    Bactrim [sulfamethoxazole-trimethoprim]      swelling and discomfort down below       Physical Exam                    Physical Exam  Constitutional:       General: He is not in acute distress.     Appearance: Normal appearance. He is not ill-appearing, toxic-appearing or diaphoretic.   Pulmonary:      Effort: No respiratory distress.   Skin:            Comments: Erythematous raised lesion to anterior  Left thigh with central pustule. Pen peterson around the lesion with some erythema outside of penmark    Neurological:      General: No focal deficit present.      Mental Status: He is alert and oriented to person, place, and time.   Psychiatric:         Mood and Affect: Mood normal.         Behavior: Behavior normal.          Laboratory:  CBC:  Recent Labs   Lab 08/15/23  0819   WBC 4.1 L   RBC 4.85   Hemoglobin 14.8   Hematocrit 43.3   Platelets 224   MCV 89.4   MCH 30.6   MCHC 34.2       CMP:  Recent Labs   Lab 08/15/23  0819   Glucose 100   Calcium 10.5   ALBUMIN 4.5   Total Protein 6.6   Sodium 138   Potassium 4.9   CO2 27   Chloride 101   BUN 16.0   Creatinine 0.95   Alkaline Phosphatase 54   ALT 15   AST 21   Total Bilirubin 1.2       LIPIDS:  Recent Labs   Lab 08/15/23  0819   TSH 3.47   HDL 57   Cholesterol 189 H   Triglycerides 145   LDL Calculated 109 H       TSH:  Recent Labs   Lab 08/15/23  0819   TSH 3.47         Recent Labs   Lab 08/15/23  0819   Hepatitis C Ab NON-REACTIVE       Assessment/Plan     Maik Orr Jr. is a 28 y.o.male with:    Abscess  -     doxycycline (VIBRA-TABS) 100 MG tablet; Take 1 tablet (100 mg total) by mouth 2 (two) times daily. for 10 days  Dispense: 20 tablet; Refill: 0  -     mupirocin (BACTROBAN) 2 % ointment; Apply yo nares BID x 5 days  Dispense: 22 g; Refill: 0  - Will Rx Doxycycline which he has prev. Tolerated. Rec warm compresses or warm showers BID -TID prn. Will also Rx Mupirocin for nares. Alert MD if Sx's worsen. Rec Ibuprofen or tylenol prn pain. Will send handout for abscess/boil. Wash things separately from others. Strict hand hygiene.     Chronic conditions status updated as per HPI.  Other than changes above, cont current medications and maintain follow up with specialists.  Follow up in about 4 months (around 7/12/2024) for well visit or sooner if needed.      Becca Bynum MD  [facility]                  Answers submitted by the patient for this visit:  Rash Questionnaire (Submitted on 2/29/2024)  Chief Complaint: Rash  Chronicity: new  Onset:  yesterday  Progression since onset: gradually worsening  Affected locations: left upper leg  Characteristics: pain, redness, swelling  Exposed to: nothing  anorexia: No  facial edema: No  joint pain: No  nail changes: No  Treatments tried: anti-itch cream, topical steroids  Improvement on treatment: no relief  asthma: No  allergies: No  eczema: No  varicella: No

## 2024-03-01 ENCOUNTER — HOSPITAL ENCOUNTER (EMERGENCY)
Facility: HOSPITAL | Age: 29
Discharge: HOME | End: 2024-03-01
Attending: EMERGENCY MEDICINE
Payer: COMMERCIAL

## 2024-03-01 VITALS
RESPIRATION RATE: 18 BRPM | SYSTOLIC BLOOD PRESSURE: 146 MMHG | WEIGHT: 185 LBS | TEMPERATURE: 98 F | HEART RATE: 67 BPM | DIASTOLIC BLOOD PRESSURE: 94 MMHG | BODY MASS INDEX: 25.06 KG/M2 | OXYGEN SATURATION: 99 % | HEIGHT: 72 IN

## 2024-03-01 DIAGNOSIS — L03.116 CELLULITIS OF LEFT LOWER EXTREMITY: Primary | ICD-10-CM

## 2024-03-01 PROCEDURE — 99284 EMERGENCY DEPT VISIT MOD MDM: CPT

## 2024-03-01 PROCEDURE — 25000003 PHARM REV CODE 250: Performed by: STUDENT IN AN ORGANIZED HEALTH CARE EDUCATION/TRAINING PROGRAM

## 2024-03-01 PROCEDURE — 25000003 PHARM REV CODE 250: Performed by: EMERGENCY MEDICINE

## 2024-03-01 RX ORDER — CEPHALEXIN 500 MG/1
500 CAPSULE ORAL 4 TIMES DAILY
Qty: 20 CAPSULE | Refills: 0 | Status: SHIPPED | OUTPATIENT
Start: 2024-03-01 | End: 2024-03-06

## 2024-03-01 RX ORDER — ACETAMINOPHEN 500 MG
1000 TABLET ORAL
Status: COMPLETED | OUTPATIENT
Start: 2024-03-01 | End: 2024-03-01

## 2024-03-01 RX ORDER — CEPHALEXIN 500 MG/1
500 CAPSULE ORAL
Status: COMPLETED | OUTPATIENT
Start: 2024-03-01 | End: 2024-03-01

## 2024-03-01 RX ADMIN — CEPHALEXIN 500 MG: 500 CAPSULE ORAL at 06:03

## 2024-03-01 RX ADMIN — ACETAMINOPHEN 1000 MG: 500 TABLET ORAL at 06:03

## 2024-03-01 NOTE — ED PROVIDER NOTES
Encounter Date: 3/1/2024       History     Chief Complaint   Patient presents with    Leg Wound     Saw pcp yesterday got abx, now large red hot and infected.       28-year-old male with past medical history of anxiety presenting to ED with left leg lesion. Patient states that he first noticed a raised papule 48 hours ago which has progressively become larger with surrounding erythema and a central blister. Patient had a virtual visit with his PCP yesterday who prescribed Doxycycline. Patient has taken two doses of antibiotics but has demarcated the area of erythema with a pen which has continued growing over the past 12 hours. He denies fevers, chills, pruritus, drainage, cough, congestion, chest pain or shortness of breath.      Review of patient's allergies indicates:   Allergen Reactions    Bactrim [sulfamethoxazole-trimethoprim]      swelling and discomfort down below     Past Medical History:   Diagnosis Date    Anxiety      Past Surgical History:   Procedure Laterality Date    EPIDURAL STEROID INJECTION INTO LUMBAR SPINE      FORAMINOTOMY      LAMINECTOMY      LUMBAR DISCECTOMY      SHOULDER ARTHROSCOPY W/ LABRAL REPAIR Left     11/2023    SHOULDER ARTHROSCOPY W/ ROTATOR CUFF REPAIR Left     11/2023     Family History   Problem Relation Age of Onset    Pancreatitis Father     Ovarian cancer Maternal Grandmother     Bladder Cancer Maternal Grandmother     Suicidality Maternal Grandfather     Lung cancer Paternal Grandfather      Social History     Tobacco Use    Smoking status: Some Days     Types: Vaping with nicotine    Smokeless tobacco: Never    Tobacco comments:     Nicotine gum/pouch and vape on occasion   Substance Use Topics    Alcohol use: Yes     Comment: 5-8 drinks - x 3 d/wk    Drug use: Yes     Types: Marijuana     Comment: rare         Physical Exam     Initial Vitals [03/01/24 1531]   BP Pulse Resp Temp SpO2   (!) 146/94 67 18 97.9 °F (36.6 °C) 99 %      MAP       --         Physical  Exam    Nursing note and vitals reviewed.  Constitutional: He is not diaphoretic. No distress.   HENT:   Head: Normocephalic and atraumatic.   Mouth/Throat: Oropharynx is clear and moist.   Eyes: Conjunctivae and EOM are normal.   Neck: Neck supple.   Normal range of motion.  Cardiovascular:  Normal rate, regular rhythm, normal heart sounds and intact distal pulses.           Pulmonary/Chest: Breath sounds normal. He has no wheezes. He has no rhonchi. He has no rales.   Abdominal: Abdomen is soft. He exhibits no distension. There is no abdominal tenderness.   Musculoskeletal:         General: No tenderness or edema. Normal range of motion.      Cervical back: Normal range of motion and neck supple.     Neurological: He is alert and oriented to person, place, and time. He has normal strength. No sensory deficit.   Skin: Skin is warm and dry. Capillary refill takes less than 2 seconds.   1.5cm fluctuant flaccid blister with surrounding erythema extending 10 cm in diameter with a lymphangitic streak along anteromedial thigh (see photo).          ED Course   Procedures  Labs Reviewed - No data to display       Imaging Results    None          Medications   cephALEXin capsule 500 mg (500 mg Oral Given 3/1/24 1820)   acetaminophen tablet 1,000 mg (1,000 mg Oral Given 3/1/24 1820)     Medical Decision Making  Differential diagnosis includes but is not limited to cellulitis, insect bite, abscess, sepsis    Patient is afebrile, HDS and nontoxic appearing. Tylenol given for pain. Bedside US showed no fluid collection to indicate abscess. There are no systemic symptoms to indicate sepsis. Patient has only had two doses of antibiotics. Will broaden antibiotics to include Keflex in addition to Doxycycline. Wound demarcated and patient advised to return if lesion continues to expand after 24 hours. PCP follow-up advised. Patient verbalized understanding and agreement with plan. Patient discharged home with return precautions. All  questions answered.     Risk  OTC drugs.  Prescription drug management.                                      Clinical Impression:  Final diagnoses:  [L03.116] Cellulitis of left lower extremity (Primary)          ED Disposition Condition    Discharge Stable          ED Prescriptions       Medication Sig Dispense Start Date End Date Auth. Provider    cephALEXin (KEFLEX) 500 MG capsule Take 1 capsule (500 mg total) by mouth 4 (four) times daily. for 5 days 20 capsule 3/1/2024 3/6/2024 Snehal Schultz MD          Follow-up Information       Follow up With Specialties Details Why Contact Info    Becca Bynum MD Internal Medicine   [address] [facility] [address]  [phone]      [facility] - Emergency Dept Emergency Medicine  As needed, If symptoms worsen [address] [facility] [address] [phone]             Snehal Schultz MD  Resident  03/02/24 4651

## 2024-03-01 NOTE — ED TRIAGE NOTES
Maik Orr , a 28 y.o. male presents to the ED w/ complaint of leg wound. Pt complains of left upper thigh leg wound. Pt states he was seen virtually and prescribed antibiotics for a leg abscess but today upon awakening the area has increased in size, redness and pain. Pt states he was prescribed antibiotics and he has two doses prior to coming in to be seen. Pt denies any other complaints at this current time.     Triage note:  Chief Complaint   Patient presents with    Leg Wound     Saw pcp yesterday got abx, now large red hot and infected.       Review of patient's allergies indicates:   Allergen Reactions    Bactrim [sulfamethoxazole-trimethoprim]      swelling and discomfort down below     Past Medical History:   Diagnosis Date    Anxiety

## 2024-03-02 NOTE — DISCHARGE INSTRUCTIONS
Home Care Instructions:  - Medications: Continue taking your home medications as prescribed. Start taking Keflex.   - Continue warm compresses.  - Take 500mg Tylenol and 400mg Ibuprofen every 6 hours as needed for pain.     Follow-Up Plan:  - Follow-up with: Primary care doctor within 3-5 days  - Additional testing and/or evaluation will be directed by your primary doctor    Return to the Emergency Department for symptoms including but not limited to: worsening symptoms, severe back pain, shortness of breath or chest pain, vomiting with inability to hold down fluids, blood in vomit or poop, fevers greater than 100.4°F, passing out/fainting/unconsciousness, or other concerning symptoms.

## 2024-03-04 ENCOUNTER — TELEPHONE (OUTPATIENT)
Dept: EMERGENCY MEDICINE | Facility: HOSPITAL | Age: 29
End: 2024-03-04
Payer: COMMERCIAL

## 2024-03-04 NOTE — TELEPHONE ENCOUNTER
ED follow-up call:  Patient seen in the ED and was treated for cellulitis.  He was noted to have slight lymphangitic streaking.  He was discharged on Keflex and doxycycline.  Patient states that his symptoms have improved significantly.  The streaking is gone and redness and pain are both improving.  Patient counseled on completing entire course of antibiotics.  Patient voiced understanding.

## 2024-06-03 ENCOUNTER — PATIENT MESSAGE (OUTPATIENT)
Dept: PRIMARY CARE CLINIC | Facility: CLINIC | Age: 29
End: 2024-06-03
Payer: COMMERCIAL

## 2024-06-03 DIAGNOSIS — R30.0 DYSURIA: Primary | ICD-10-CM

## 2024-06-04 ENCOUNTER — LAB VISIT (OUTPATIENT)
Dept: LAB | Facility: HOSPITAL | Age: 29
End: 2024-06-04
Attending: INTERNAL MEDICINE
Payer: COMMERCIAL

## 2024-06-04 ENCOUNTER — TELEPHONE (OUTPATIENT)
Dept: PRIMARY CARE CLINIC | Facility: CLINIC | Age: 29
End: 2024-06-04
Payer: COMMERCIAL

## 2024-06-04 ENCOUNTER — OFFICE VISIT (OUTPATIENT)
Dept: PRIMARY CARE CLINIC | Facility: CLINIC | Age: 29
End: 2024-06-04
Payer: COMMERCIAL

## 2024-06-04 DIAGNOSIS — R30.0 DYSURIA: Primary | ICD-10-CM

## 2024-06-04 DIAGNOSIS — Z72.51 HIGH RISK SEXUAL BEHAVIOR, UNSPECIFIED TYPE: ICD-10-CM

## 2024-06-04 DIAGNOSIS — R30.0 DYSURIA: ICD-10-CM

## 2024-06-04 LAB
BILIRUB UR QL STRIP: NEGATIVE
CLARITY UR REFRACT.AUTO: CLEAR
COLOR UR AUTO: COLORLESS
GLUCOSE UR QL STRIP: NEGATIVE
HGB UR QL STRIP: NEGATIVE
KETONES UR QL STRIP: NEGATIVE
LEUKOCYTE ESTERASE UR QL STRIP: ABNORMAL
MICROSCOPIC COMMENT: NORMAL
NITRITE UR QL STRIP: NEGATIVE
PH UR STRIP: 7 [PH] (ref 5–8)
PROT UR QL STRIP: NEGATIVE
RBC #/AREA URNS AUTO: 1 /HPF (ref 0–4)
SP GR UR STRIP: 1 (ref 1–1.03)
URN SPEC COLLECT METH UR: ABNORMAL
WBC #/AREA URNS AUTO: 4 /HPF (ref 0–5)

## 2024-06-04 PROCEDURE — 87591 N.GONORRHOEAE DNA AMP PROB: CPT | Performed by: INTERNAL MEDICINE

## 2024-06-04 PROCEDURE — 1160F RVW MEDS BY RX/DR IN RCRD: CPT | Mod: CPTII,95,, | Performed by: INTERNAL MEDICINE

## 2024-06-04 PROCEDURE — 99213 OFFICE O/P EST LOW 20 MIN: CPT | Mod: 95,,, | Performed by: INTERNAL MEDICINE

## 2024-06-04 PROCEDURE — 81001 URINALYSIS AUTO W/SCOPE: CPT | Performed by: INTERNAL MEDICINE

## 2024-06-04 PROCEDURE — 1159F MED LIST DOCD IN RCRD: CPT | Mod: CPTII,95,, | Performed by: INTERNAL MEDICINE

## 2024-06-04 NOTE — TELEPHONE ENCOUNTER
----- Message from Anum Chambers sent at 6/4/2024  4:12 PM CDT -----  Contact: [phone]  Patient is returning a phone call.  Who left a message for the patient: Rajani  Does patient know what this is regarding:  earlier conversation  Would you like a call back, or a response through your [facility] portal?:   call  Comments:

## 2024-06-04 NOTE — TELEPHONE ENCOUNTER
I sw pt and confiremd that address again for the [facility] lab. Pt states when he put in his gps and it said [facility]

## 2024-06-04 NOTE — PROGRESS NOTES
"[facility] Note    Chief Complaint    No chief complaint on file.      History of Present Illness      Maik Orr Jr. is a 28 y.o.   Anxiety, DJD - L spine, Postlaminectomy Syndrome presents to fu same day visit for urinary issues. Referred by his Mom- Pati Orr. Last visit - 2/29/24    The patient location is: work  The chief complaint leading to consultation is: "Fu urinary Sx's x 1 wk ago"    Visit type: audiovisual    Face to Face time with patient: 12 min.  12 minutes of total time spent on the encounter, which includes face to face time and non-face to face time preparing to see the patient (eg, review of tests), Obtaining and/or reviewing separately obtained history, Documenting clinical information in the electronic or other health record, Independently interpreting results (not separately reported) and communicating results to the patient/family/caregiver, or Care coordination (not separately reported).         Each patient to whom he or she provides medical services by telemedicine is:  (1) informed of the relationship between the physician and patient and the respective role of any other health care provider with respect to management of the patient; and (2) notified that he or she may decline to receive medical services by telemedicine and may withdraw from such care at any time.    Notes:       Urinary Sx's - having some discomfort when urinating for about 1 week. Urethral opening seems uncomfortable even when not urinating but worse when urinating. 5/10 in severity.  No rash, sores, discharge, or other visible symptoms. No new partners.  New partner since Mar.  Uses condoms sometimes.  No F/C. No N/V.  No dark urine. No flank pain. No hesitancy.  My urine has been cloudy on a few occasions. It's just generally uncomfortable down there.       HCM - Flu - none;  Tdap -8/4/10 - due;  Gardasil - 7/9/12;  Hep C Screen - 8/15/23- neg;  HIV Screen - 2/3/21- neg; C-scope - none;  Prev PCP " -Dr. Florian Schaefer at [facility] and saw Dr. Fulton at [facility] once; Ortho - Dr. Yeung; Pain Mgmnt - ?; Ortho - Dr. Norman/Ivana;CRS - Dr. Robert     Patient Care Team:  Becca Bynum MD as PCP - General (Internal Medicine)  iMlvia Fierro MA as Care Coordinator     Health Maintenance:  Immunization History   Administered Date(s) Administered    DTaP 1994, 1994, 01/15/1995, 01/03/1996, 07/13/1999    HIB 1994, 1994, 01/05/1995, 10/12/1995    HPV Quadrivalent 07/09/2012    Hepatitis B, Pediatric/Adolescent 1994, 1994, 04/03/1995    IPV 1994, 1994, 01/03/1996, 07/13/1999    Influenza - Intranasal 10/02/2007    Influenza - Quadrivalent - MDCK - PF 10/30/2017, 11/13/2019    Influenza - Quadrivalent - PF *Preferred* (6 months and older) 10/16/2002, 10/15/2003, 10/08/2004, 10/26/2005, 10/20/2006, 09/16/2009, 10/20/2014, 10/05/2015, 10/12/2016, 11/09/2020, 10/26/2022    Influenza A (H1N1) 2009 Monovalent - IM 11/03/2009    MMR 10/12/1995, 07/13/1999    Meningococcal Conjugate (MCV4P) 08/02/2005, 07/09/2012    PPD Test 07/07/1995    Td (Adult), Unspecified Formulation 08/02/2005    Tdap 08/04/2010    Varicella 03/01/2007, 07/08/2009      Health Maintenance   Topic Date Due    TETANUS VACCINE  08/04/2020    Hepatitis C Screening  Completed    Lipid Panel  Completed        Past Medical History:  Past Medical History:   Diagnosis Date    Anxiety        Past Surgical History:   has a past surgical history that includes Lumbar discectomy; Foraminotomy; Laminectomy; Epidural steroid injection into lumbar spine; Shoulder arthroscopy w/ rotator cuff repair (Left); Shoulder arthroscopy w/ labral repair (Left); and Spine surgery (2013).    Family History:  family history includes Bladder Cancer in his maternal grandmother; Lung cancer in his paternal grandfather; Ovarian cancer in his maternal grandmother; Pancreatitis in his father; Suicidality in his maternal  grandfather.     Social History:  Social History     Tobacco Use    Smoking status: Some Days     Types: Vaping with nicotine    Smokeless tobacco: Never    Tobacco comments:     Nicotine gum/pouch and vape on occasion   Substance Use Topics    Alcohol use: Yes     Alcohol/week: 10.0 standard drinks of alcohol     Types: 10 Cans of beer per week     Comment: 5-8 drinks - x 3 d/wk    Drug use: Not Currently     Types: Marijuana     Comment: rare       Review of Systems   Constitutional:  Negative for activity change, chills, fever and unexpected weight change.   HENT:  Negative for hearing loss, rhinorrhea and trouble swallowing.    Eyes:  Negative for discharge and visual disturbance.   Respiratory:  Negative for chest tightness and wheezing.    Cardiovascular:  Negative for chest pain and palpitations.   Gastrointestinal:  Negative for abdominal pain, blood in stool, constipation, diarrhea, nausea, rectal pain and vomiting.   Endocrine: Positive for polyuria. Negative for polydipsia.   Genitourinary:  Positive for difficulty urinating, frequency and urgency. Negative for bladder incontinence, discharge, flank pain, hematuria, penile pain, scrotal swelling and testicular pain.   Musculoskeletal:  Negative for arthralgias, joint swelling and neck pain.   Integumentary:  Positive for color change (psa).   Neurological:  Negative for weakness and headaches.   Psychiatric/Behavioral:  Negative for confusion and dysphoric mood.         Medications:    Current Outpatient Medications:     ALPRAZolam (XANAX) 0.5 MG tablet, 1 po 30-60 min prior to flight, Disp: 8 tablet, Rfl: 0    FLUARIX QUAD 3473-2035, PF, 60 mcg (15 mcg x 4)/0.5 mL Syrg, , Disp: , Rfl:     multivitamin (ONE DAILY MULTIVITAMIN) per tablet, Take 1 tablet by mouth once daily., Disp: , Rfl:     mupirocin (BACTROBAN) 2 % ointment, Apply yo nares BID x 5 days, Disp: 22 g, Rfl: 0     Allergies:  Review of patient's allergies indicates:   Allergen Reactions     Bactrim [sulfamethoxazole-trimethoprim]      swelling and discomfort down below       Physical Exam                    Physical Exam  Constitutional:       General: He is not in acute distress.     Appearance: Normal appearance. He is not ill-appearing, toxic-appearing or diaphoretic.   HENT:      Head: Normocephalic and atraumatic.   Pulmonary:      Effort: No respiratory distress.   Neurological:      General: No focal deficit present.      Mental Status: He is alert and oriented to person, place, and time.   Psychiatric:         Mood and Affect: Mood normal.         Behavior: Behavior normal.          Laboratory:  CBC:  Recent Labs   Lab 08/15/23  0819   WBC 4.1 L   RBC 4.85   Hemoglobin 14.8   Hematocrit 43.3   Platelets 224   MCV 89.4   MCH 30.6   MCHC 34.2       CMP:  Recent Labs   Lab 08/15/23  0819   Glucose 100   Calcium 10.5   ALBUMIN 4.5   Total Protein 6.6   Sodium 138   Potassium 4.9   CO2 27   Chloride 101   BUN 16.0   Creatinine 0.95   Alkaline Phosphatase 54   ALT 15   AST 21   Total Bilirubin 1.2     LIPIDS:  Recent Labs   Lab 08/15/23  0819   TSH 3.47   HDL 57   Cholesterol 189 H   Triglycerides 145   LDL Calculated 109 H       TSH:  Recent Labs   Lab 08/15/23  0819   TSH 3.47           Recent Labs   Lab 08/15/23  0819   Hepatitis C Ab NON-REACTIVE       Assessment/Plan     Maik Orr Jr. is a 28 y.o.male with:    Dysuria  -     PSA, SCREENING; Future; Expected date: 06/04/2024  - Will check PSA and UA/reflex Ucx.  DDX incl UTI, Prostatitis.  Will check For STI as well. Await results to see if Abx needed.     High risk sexual behavior, unspecified type  -     Hepatitis Panel, Acute; Future; Expected date: 06/04/2024  -     HIV 1/2 Ag/Ab (4th Gen); Future; Expected date: 06/04/2024  -     C. trachomatis/N. gonorrhoeae by AMP [facility]; Urine; Future; Expected date: 06/04/2024         Chronic conditions status updated as per HPI.  Other than changes above, cont current medications and maintain  follow up with specialists.   Follow up in about 2 months (around 8/4/2024) for well visit or sooner if needed.      Becca Bynum MD  [facility]

## 2024-06-05 LAB
C TRACH DNA SPEC QL NAA+PROBE: NOT DETECTED
N GONORRHOEA DNA SPEC QL NAA+PROBE: NOT DETECTED

## 2024-10-29 ENCOUNTER — OFFICE VISIT (OUTPATIENT)
Dept: URGENT CARE | Facility: CLINIC | Age: 29
End: 2024-10-29
Payer: COMMERCIAL

## 2024-10-29 VITALS
DIASTOLIC BLOOD PRESSURE: 87 MMHG | BODY MASS INDEX: 25.73 KG/M2 | RESPIRATION RATE: 18 BRPM | HEART RATE: 50 BPM | SYSTOLIC BLOOD PRESSURE: 137 MMHG | TEMPERATURE: 98 F | OXYGEN SATURATION: 98 % | WEIGHT: 190 LBS | HEIGHT: 72 IN

## 2024-10-29 DIAGNOSIS — R05.9 COUGH, UNSPECIFIED TYPE: ICD-10-CM

## 2024-10-29 DIAGNOSIS — J06.9 VIRAL URI WITH COUGH: Primary | ICD-10-CM

## 2024-10-29 LAB
CTP QC/QA: YES
SARS-COV-2 AG RESP QL IA.RAPID: NEGATIVE

## 2024-10-29 RX ORDER — PROMETHAZINE HYDROCHLORIDE AND DEXTROMETHORPHAN HYDROBROMIDE 6.25; 15 MG/5ML; MG/5ML
5 SYRUP ORAL EVERY 8 HOURS PRN
Qty: 118 ML | Refills: 0 | Status: SHIPPED | OUTPATIENT
Start: 2024-10-29 | End: 2024-11-08

## 2024-10-29 RX ORDER — IPRATROPIUM BROMIDE 21 UG/1
1 SPRAY, METERED NASAL 2 TIMES DAILY
Qty: 30 ML | Refills: 0 | Status: SHIPPED | OUTPATIENT
Start: 2024-10-29 | End: 2024-11-05

## 2025-05-21 NOTE — PROGRESS NOTES
"[facility] Note    Chief Complaint      Chief Complaint   Patient presents with    Annual Exam       History of Present Illness      Maik Orr Jr. is a 29 y.o.   Anxiety, DJD - L spine, Postlaminectomy Syndrome presents to fu same day visit for urinary issues. Referred by his Mom- Pati Orr. Last visit - 6/4/24    Left shoulder pain - Fu by Dr. Heath. He dx STAM, functional very end where it this could respond and she would respond to a good rehabilitation program. 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 He saw Cards x 2 in past and was reassured. He started having The pain while lying in bed with his GF watching tV.  T's better when he moves the arm and is ative or if he isn't thinking about it. He was not anxious at that time. Does not bother him in court.  Sitting in jury duty he had it.  While at his GF's graduation he had one. He starts looking for the exits. "It's impending doom".     H/o Hyperbilirubinemia -  1.2 - resolved. . This has been elevated in the past.  This is usually benign & not worrisome. It is common & can  elevate when you are dehydrated (as with fasting for lab work). It won't cause any problems. Sometimes this can be mildly elevated due to a genetic disorder called Gilbert's which does not require any intervention or treatment. I would just like to repeat this. If still elev consider RUQ U/S.      Leukopenia - White blood cell count was just below normal. We can repeat this with upcoming labs      Anxiety - Pt weaned off sertraline 50 mg/d in '19. It caused some Dec libido and didn't help much with his Anxiety.   He was on paxil in past- felt worse. Had panic attacks in [place] and was started on paxil and tried to wean off in [employer] School and " had panic attacks and restarted medication.  He tried gluten free diet - no help. No SI, no HI.  He does report 1-2 panic attacks that were unprovoked when he is lying in bad at night. Gets an electric jolt feeling when he first weaned - this has resolved. He prefers to try not to resume pharmacotherapy but is aware we can resume this again.  He exercises x 4/wk.  He would like to see a therapist. He freq thinks about a falling out he had with 2 lifelong friends in 2021 and would like to discuss this in therapy.  He started having panic attacks again. He will suddenly get anxious and get Left CP that radiates from his scapula.  These occur 1/wk.      DJD L -spine -  Was working out 5 d/wk until he tore rotator cuff.  He is not on any meds for this. He gets injections prn.      Postlaminectomy Syndrome - Was working out 5 d/wk.  He is not on any meds for this. He gets injections prn.      Lumbar stenosis -  Was working out 5 d/wk.  He is not on any meds for this. He gets injections prn.      Heart murmur -   Pt aware since childhood. Had prev stress Echo. EKG with Cards.      Left rotator cuff tear and labrum tear - s/p Left rotator cuff tear repair in Nov. [address].  Doing well.  Fu by Dr. Heath.      Concentration deficit - Pt has never been formally tested. Rec he do so. He took 5 mg 1-2/wk since College. He is a  and it helps him focus.      HCM - Flu - none;  Tdap -8/4/10 - due;  Gardasil - 7/9/12;  Hep C Screen - 6/4/24- neg;  HIV Screen - 6/4/24- neg; C-scope - none;  Prev PCP -Dr. Florian Schaefer at [facility] and saw Dr. Fulton at [facility] once; Ortho - Dr. Yeung; Pain Mgmnt - ?; Ortho - Dr. Norman/Ivana;CRS - Dr. Robert     Patient Care Team:  Becca Bynum MD as PCP - General (Internal Medicine)     Health Maintenance:  Immunization History   Administered Date(s) Administered    DTaP 1994, 1994, 01/15/1995, 01/03/1996, 07/13/1999    HIB 1994, 1994, 01/05/1995, 10/12/1995     HPV Quadrivalent 07/09/2012    Hepatitis B, Pediatric/Adolescent 1994, 1994, 04/03/1995    IPV 1994, 1994, 01/03/1996, 07/13/1999    Influenza - Intranasal 10/02/2007    Influenza - Quadrivalent - MDCK - PF 10/30/2017, 11/13/2019    Influenza - Quadrivalent - PF *Preferred* (6 months and older) 10/16/2002, 10/15/2003, 10/08/2004, 10/26/2005, 10/20/2006, 09/16/2009, 10/20/2014, 10/05/2015, 10/12/2016, 11/09/2020, 10/26/2022    Influenza A (H1N1) 2009 Monovalent - IM 11/03/2009    MMR 10/12/1995, 07/13/1999    Meningococcal Conjugate (MCV4P) 08/02/2005, 07/09/2012    PPD Test 07/07/1995    Td (Adult), Unspecified Formulation 08/02/2005    Tdap 08/04/2010    Varicella 03/01/2007, 07/08/2009      Health Maintenance   Topic Date Due    Pneumococcal Vaccines (Age 0-49) (1 of 2 - PCV) Never done    TETANUS VACCINE  08/04/2020    COVID-19 Vaccine (1 - 2024-25 season) Never done    Influenza Vaccine (Season Ended) 09/01/2025    RSV Vaccine (Age 60+ and Pregnant patients) (1 - 1-dose 75+ series) 07/04/2069    Hepatitis C Screening  Completed    HIV Screening  Completed    Lipid Panel  Completed        Past Medical History:  Past Medical History:   Diagnosis Date    Anxiety        Past Surgical History:   has a past surgical history that includes Lumbar discectomy; Foraminotomy; Laminectomy; Epidural steroid injection into lumbar spine; Shoulder arthroscopy w/ rotator cuff repair (Left); Shoulder arthroscopy w/ labral repair (Left); and Spine surgery (2013).    Family History:  family history includes Bladder Cancer in his maternal grandmother; Lung cancer in his paternal grandfather; Ovarian cancer in his maternal grandmother; Pancreatitis in his father; Suicidality in his maternal grandfather.     Social History:  Social History[1]    Review of Systems   Constitutional:  Negative for chills, fever and night sweats.   HENT:  Negative for rhinorrhea and sore throat.    Respiratory:  Negative for  cough and shortness of breath.    Cardiovascular:  Positive for chest pain and palpitations.        He checks his pulse and it is ok.    Gastrointestinal:  Negative for abdominal pain, constipation, diarrhea, nausea and vomiting.   Endocrine: Negative for cold intolerance, heat intolerance and polydipsia.   Genitourinary:  Negative for dysuria and frequency.   Musculoskeletal:  Positive for arthralgias.   Neurological:  Positive for dizziness. Negative for headaches.        Gets dizzy like his balance is off; like he is on a boat when he gets anxious. Lats 15  min.    Psychiatric/Behavioral:  Positive for sleep disturbance. Negative for depressed mood. The patient is nervous/anxious.         He is now having anxiety about having anxiety. He sleeps well.         Medications:  Current Medications[2]     Allergies:  Review of patient's allergies indicates:   Allergen Reactions    Bactrim [sulfamethoxazole-trimethoprim]      swelling and discomfort down below       Physical Exam      Vital Signs  Temp: 97.6 °F (36.4 °C)  Temp Source: Oral  Pulse: 70  Resp: 18  SpO2: 100 %  BP: 132/80  BP Location: Left arm  Patient Position: Sitting  Pain Score: 0-No pain  Height and Weight  Height: 6' (182.9 cm)  Weight: 94.7 kg (208 lb 12.4 oz)  BSA (Calculated - sq m): 2.19 sq meters  BMI (Calculated): 28.3  Weight in (lb) to have BMI = 25: 183.9      Patient Position: Sitting      Physical Exam  Vitals reviewed.   Constitutional:       General: He is not in acute distress.     Appearance: Normal appearance. He is not ill-appearing, toxic-appearing or diaphoretic.   HENT:      Head: Normocephalic and atraumatic.      Right Ear: Tympanic membrane normal.      Left Ear: Tympanic membrane normal.      Mouth/Throat:      Mouth: Mucous membranes are moist.   Eyes:      Extraocular Movements: Extraocular movements intact.      Conjunctiva/sclera: Conjunctivae normal.      Pupils: Pupils are equal, round, and reactive to light.   Neck:       Vascular: No carotid bruit.   Cardiovascular:      Rate and Rhythm: Normal rate and regular rhythm.      Pulses: Normal pulses.      Heart sounds: Normal heart sounds.      Comments: Slight TTP over Left chest- Feels some pain when we abduct and ext rotate his shoulder actively and passive motion  Pulmonary:      Effort: No respiratory distress.      Breath sounds: Normal breath sounds. No wheezing.   Chest:       Abdominal:      General: Bowel sounds are normal. There is no distension.      Palpations: Abdomen is soft.      Tenderness: There is no abdominal tenderness. There is no guarding or rebound.   Skin:     General: Skin is warm.   Neurological:      General: No focal deficit present.      Mental Status: He is alert and oriented to person, place, and time.   Psychiatric:         Mood and Affect: Mood normal.         Behavior: Behavior normal.          Laboratory:  CBC:  Recent Labs   Lab 08/15/23  0819   WBC 4.1 L   RBC 4.85   Hemoglobin 14.8   Hematocrit 43.3   Platelets 224   MCV 89.4   MCH 30.6   MCHC 34.2       CMP:  Recent Labs   Lab 08/15/23  0819 05/22/25  1142   Glucose 100 101   Calcium 10.5 9.9   Albumin  --  4.5   ALBUMIN 4.5  --    Protein Total  --  7.4   Total Protein 6.6  --    Sodium 138 141   Potassium 4.9 4.3   CO2 27 25   Chloride 101 107   BUN 16.0 15   Creatinine 0.95 1.0   Alkaline Phosphatase 54  --    ALP  --  64   ALT 15 18   AST 21 22   Total Bilirubin 1.2  --    Bilirubin Total  --  1.4 H         URINALYSIS:  Recent Labs   Lab 06/04/24  1624   Color, UA Colorless A   Specific Gravity, UA 1.005   pH, UA 7.0   Protein, UA Negative   Nitrite, UA Negative   Leukocytes, UA Trace A        LIPIDS:  Recent Labs   Lab 08/15/23  0819 05/22/25  1142   TSH 3.47 2.214   HDL 57  --    HDL Cholesterol  --  52   Cholesterol Total  --  205 H   Cholesterol 189 H  --    Triglycerides 145  --    Triglyceride  --  76   LDL Cholesterol  --  137.8   LDL Calculated 109 H  --    HDL/Cholesterol Ratio  --   25.4   Non HDL Cholesterol  --  153   Cholesterol/HDL Ratio  --  3.9       TSH:  Recent Labs   Lab 08/15/23  0819 05/22/25  1142   TSH 3.47 2.214         Recent Labs   Lab 06/04/24  1628   PSA, Screen 0.52   Hepatitis C Ab Non-reactive       Assessment/Plan     Maik Orr Jr. is a 29 y.o.male with:    Normal physical exam, routine  -     CBC Auto Differential; Future; Expected date: 05/22/2025  -     Comprehensive Metabolic Panel; Future; Expected date: 05/22/2025  -     TSH; Future; Expected date: 05/22/2025  -     Hemoglobin A1C; Future; Expected date: 05/22/2025  -     IN OFFICE EKG 12-LEAD (to [place])  - Performed today.  Will check Basic labs.  RTC in 1 yr for fu or sooner if needed    Chest pain, unspecified type  - Suspect MSK based on exam.  Pt reports neg nixon in past but this was in [place] and the records are unavailable.  + Interventricular conduction delay. Unclear if noted on prev EKG.  Will refer to cards again.  I  d/w pt.     Anxiety  -     Cancel: Ambulatory referral/consult to Psychology; Future; Expected date: 05/29/2025  -     Ambulatory referral/consult to Psychology; Future; Expected date: 05/29/2025  -     ALPRAZolam (XANAX) 0.5 MG tablet; 1 po 30-60 min before flight  Dispense: 30 tablet; Refill: 0  - Refer to Psychology. Pt with known health anxiety.  Will Rx Ativan for prn use on upcoming trip. Pt declines daily pharmacotherapy due to potential S.E. Could consider Buspar in future if needed.  Will refer to Psychology.     Leukopenia, unspecified type  - Repeat CBC.     Screening for lipoid disorders  -     Lipid Panel; Future; Expected date: 05/22/2025    Chronic conditions status updated as per HPI.  Other than changes above, cont current medications and maintain follow up with specialists.  Follow up in about 1 year (around 5/23/2026) for well visit or sooner if needed.      Becca Bynum MD  [facility]                       [1]   Social History  Tobacco Use    Smoking status: Some  Days     Types: Vaping with nicotine    Smokeless tobacco: Never    Tobacco comments:     Nicotine gum/pouch and vape on occasion   Vaping Use    Vaping status: Some Days   Substance Use Topics    Alcohol use: Yes     Alcohol/week: 10.0 standard drinks of alcohol     Types: 10 Cans of beer per week     Comment: 2-5 x 2/wk    Drug use: Not Currently     Types: Marijuana     Comment: rare   [2]   Current Outpatient Medications:     ALPRAZolam (XANAX) 0.5 MG tablet, 1 po 30-60 min before flight, Disp: 30 tablet, Rfl: 0

## 2025-05-22 ENCOUNTER — LAB VISIT (OUTPATIENT)
Dept: LAB | Facility: HOSPITAL | Age: 30
End: 2025-05-22
Attending: INTERNAL MEDICINE
Payer: COMMERCIAL

## 2025-05-22 ENCOUNTER — OFFICE VISIT (OUTPATIENT)
Dept: PRIMARY CARE CLINIC | Facility: CLINIC | Age: 30
End: 2025-05-22
Payer: COMMERCIAL

## 2025-05-22 ENCOUNTER — RESULTS FOLLOW-UP (OUTPATIENT)
Dept: PRIMARY CARE CLINIC | Facility: CLINIC | Age: 30
End: 2025-05-22

## 2025-05-22 VITALS
WEIGHT: 208.75 LBS | HEART RATE: 70 BPM | DIASTOLIC BLOOD PRESSURE: 80 MMHG | SYSTOLIC BLOOD PRESSURE: 132 MMHG | TEMPERATURE: 98 F | HEIGHT: 72 IN | OXYGEN SATURATION: 100 % | RESPIRATION RATE: 18 BRPM | BODY MASS INDEX: 28.27 KG/M2

## 2025-05-22 DIAGNOSIS — R94.31 ABNORMAL EKG: ICD-10-CM

## 2025-05-22 DIAGNOSIS — F41.9 ANXIETY: ICD-10-CM

## 2025-05-22 DIAGNOSIS — Z00.00 NORMAL PHYSICAL EXAM, ROUTINE: Primary | ICD-10-CM

## 2025-05-22 DIAGNOSIS — Z00.00 NORMAL PHYSICAL EXAM, ROUTINE: ICD-10-CM

## 2025-05-22 DIAGNOSIS — D72.819 LEUKOPENIA, UNSPECIFIED TYPE: ICD-10-CM

## 2025-05-22 DIAGNOSIS — R07.9 CHEST PAIN, UNSPECIFIED TYPE: ICD-10-CM

## 2025-05-22 DIAGNOSIS — Z13.220 SCREENING FOR LIPOID DISORDERS: ICD-10-CM

## 2025-05-22 LAB
ABSOLUTE EOSINOPHIL (OHS): 0.12 K/UL
ABSOLUTE MONOCYTE (OHS): 0.46 K/UL (ref 0.3–1)
ABSOLUTE NEUTROPHIL COUNT (OHS): 2.05 K/UL (ref 1.8–7.7)
ALBUMIN SERPL BCP-MCNC: 4.5 G/DL (ref 3.5–5.2)
ALP SERPL-CCNC: 64 UNIT/L (ref 40–150)
ALT SERPL W/O P-5'-P-CCNC: 18 UNIT/L (ref 10–44)
ANION GAP (OHS): 9 MMOL/L (ref 8–16)
AST SERPL-CCNC: 22 UNIT/L (ref 11–45)
BASOPHILS # BLD AUTO: 0.04 K/UL
BASOPHILS NFR BLD AUTO: 0.9 %
BILIRUB SERPL-MCNC: 1.4 MG/DL (ref 0.1–1)
BUN SERPL-MCNC: 15 MG/DL (ref 6–20)
CALCIUM SERPL-MCNC: 9.9 MG/DL (ref 8.7–10.5)
CHLORIDE SERPL-SCNC: 107 MMOL/L (ref 95–110)
CHOLEST SERPL-MCNC: 205 MG/DL (ref 120–199)
CHOLEST/HDLC SERPL: 3.9 {RATIO} (ref 2–5)
CO2 SERPL-SCNC: 25 MMOL/L (ref 23–29)
CREAT SERPL-MCNC: 1 MG/DL (ref 0.5–1.4)
EAG (OHS): 94 MG/DL (ref 68–131)
ERYTHROCYTE [DISTWIDTH] IN BLOOD BY AUTOMATED COUNT: 11.9 % (ref 11.5–14.5)
GFR SERPLBLD CREATININE-BSD FMLA CKD-EPI: >60 ML/MIN/1.73/M2
GLUCOSE SERPL-MCNC: 101 MG/DL (ref 70–110)
HBA1C MFR BLD: 4.9 % (ref 4–5.6)
HCT VFR BLD AUTO: 43.3 % (ref 40–54)
HDLC SERPL-MCNC: 52 MG/DL (ref 40–75)
HDLC SERPL: 25.4 % (ref 20–50)
HGB BLD-MCNC: 14.4 GM/DL (ref 14–18)
IMM GRANULOCYTES # BLD AUTO: 0.01 K/UL (ref 0–0.04)
IMM GRANULOCYTES NFR BLD AUTO: 0.2 % (ref 0–0.5)
LDLC SERPL CALC-MCNC: 137.8 MG/DL (ref 63–159)
LYMPHOCYTES # BLD AUTO: 1.92 K/UL (ref 1–4.8)
MCH RBC QN AUTO: 29.7 PG (ref 27–31)
MCHC RBC AUTO-ENTMCNC: 33.3 G/DL (ref 32–36)
MCV RBC AUTO: 89 FL (ref 82–98)
NONHDLC SERPL-MCNC: 153 MG/DL
NUCLEATED RBC (/100WBC) (OHS): 0 /100 WBC
OHS QRS DURATION: 146 MS
OHS QTC CALCULATION: 413 MS
PLATELET # BLD AUTO: 239 K/UL (ref 150–450)
PMV BLD AUTO: 12.2 FL (ref 9.2–12.9)
POTASSIUM SERPL-SCNC: 4.3 MMOL/L (ref 3.5–5.1)
PROT SERPL-MCNC: 7.4 GM/DL (ref 6–8.4)
RBC # BLD AUTO: 4.85 M/UL (ref 4.6–6.2)
RELATIVE EOSINOPHIL (OHS): 2.6 %
RELATIVE LYMPHOCYTE (OHS): 41.7 % (ref 18–48)
RELATIVE MONOCYTE (OHS): 10 % (ref 4–15)
RELATIVE NEUTROPHIL (OHS): 44.6 % (ref 38–73)
SODIUM SERPL-SCNC: 141 MMOL/L (ref 136–145)
TRIGL SERPL-MCNC: 76 MG/DL (ref 30–150)
TSH SERPL-ACNC: 2.21 UIU/ML (ref 0.4–4)
WBC # BLD AUTO: 4.6 K/UL (ref 3.9–12.7)

## 2025-05-22 PROCEDURE — 85025 COMPLETE CBC W/AUTO DIFF WBC: CPT

## 2025-05-22 PROCEDURE — 99999 PR PBB SHADOW E&M-EST. PATIENT-LVL V: CPT | Mod: PBBFAC,,, | Performed by: INTERNAL MEDICINE

## 2025-05-22 PROCEDURE — 1159F MED LIST DOCD IN RCRD: CPT | Mod: CPTII,S$GLB,, | Performed by: INTERNAL MEDICINE

## 2025-05-22 PROCEDURE — 83036 HEMOGLOBIN GLYCOSYLATED A1C: CPT

## 2025-05-22 PROCEDURE — 84443 ASSAY THYROID STIM HORMONE: CPT

## 2025-05-22 PROCEDURE — 1160F RVW MEDS BY RX/DR IN RCRD: CPT | Mod: CPTII,S$GLB,, | Performed by: INTERNAL MEDICINE

## 2025-05-22 PROCEDURE — 3044F HG A1C LEVEL LT 7.0%: CPT | Mod: CPTII,S$GLB,, | Performed by: INTERNAL MEDICINE

## 2025-05-22 PROCEDURE — 3075F SYST BP GE 130 - 139MM HG: CPT | Mod: CPTII,S$GLB,, | Performed by: INTERNAL MEDICINE

## 2025-05-22 PROCEDURE — 3079F DIAST BP 80-89 MM HG: CPT | Mod: CPTII,S$GLB,, | Performed by: INTERNAL MEDICINE

## 2025-05-22 PROCEDURE — 80053 COMPREHEN METABOLIC PANEL: CPT

## 2025-05-22 PROCEDURE — 93005 ELECTROCARDIOGRAM TRACING: CPT | Mod: S$GLB,,, | Performed by: INTERNAL MEDICINE

## 2025-05-22 PROCEDURE — 93010 ELECTROCARDIOGRAM REPORT: CPT | Mod: S$GLB,,, | Performed by: INTERNAL MEDICINE

## 2025-05-22 PROCEDURE — 99395 PREV VISIT EST AGE 18-39: CPT | Mod: S$GLB,,, | Performed by: INTERNAL MEDICINE

## 2025-05-22 PROCEDURE — 3008F BODY MASS INDEX DOCD: CPT | Mod: CPTII,S$GLB,, | Performed by: INTERNAL MEDICINE

## 2025-05-22 PROCEDURE — 80061 LIPID PANEL: CPT

## 2025-05-22 PROCEDURE — 36415 COLL VENOUS BLD VENIPUNCTURE: CPT | Mod: PN

## 2025-05-22 RX ORDER — ALPRAZOLAM 0.5 MG/1
TABLET ORAL
Qty: 30 TABLET | Refills: 0 | Status: SHIPPED | OUTPATIENT
Start: 2025-05-22

## 2025-05-23 NOTE — PROGRESS NOTES
I sent pt a my chart message -  I reviewed your  labs.  Your Ha1c was normal at 4.9. Your thyroid functions are normal.  Your Cholesterol looked Borderline high.   You do not require medication for this at this time but I do recommend  a low cholesterol diet and exercise.  You can visit the American heart association website at heart.org for further info on a low cholesterol diet.  Your kidney function and liver functions looked good. Your bilirubin level was slightly high again at 1.4.  This is usually benign & not worrisome. It is common & can elevate when you are dehydrated (as with fasting for lab work).  Sometimes this can be mildly elevated due to a genetic disorder called Gilbert's which does not require any intervention or treatment.   We could consider a Liver ultrasound in the future if you are agreeable to rule out other sources. You are not anemic. No further recommendations at this time.  Dr. RAMOS

## 2025-06-11 ENCOUNTER — OFFICE VISIT (OUTPATIENT)
Dept: CARDIOLOGY | Facility: CLINIC | Age: 30
End: 2025-06-11
Payer: COMMERCIAL

## 2025-06-11 VITALS
WEIGHT: 208.31 LBS | DIASTOLIC BLOOD PRESSURE: 80 MMHG | SYSTOLIC BLOOD PRESSURE: 138 MMHG | HEART RATE: 51 BPM | BODY MASS INDEX: 28.26 KG/M2

## 2025-06-11 DIAGNOSIS — F41.9 ANXIETY: ICD-10-CM

## 2025-06-11 DIAGNOSIS — R07.89 OTHER CHEST PAIN: Primary | ICD-10-CM

## 2025-06-11 DIAGNOSIS — I45.10 RBBB: ICD-10-CM

## 2025-06-11 PROCEDURE — 3008F BODY MASS INDEX DOCD: CPT | Mod: CPTII,S$GLB,, | Performed by: INTERNAL MEDICINE

## 2025-06-11 PROCEDURE — 1159F MED LIST DOCD IN RCRD: CPT | Mod: CPTII,S$GLB,, | Performed by: INTERNAL MEDICINE

## 2025-06-11 PROCEDURE — 99999 PR PBB SHADOW E&M-EST. PATIENT-LVL III: CPT | Mod: PBBFAC,,, | Performed by: INTERNAL MEDICINE

## 2025-06-11 PROCEDURE — 99204 OFFICE O/P NEW MOD 45 MIN: CPT | Mod: S$GLB,,, | Performed by: INTERNAL MEDICINE

## 2025-06-11 PROCEDURE — 1160F RVW MEDS BY RX/DR IN RCRD: CPT | Mod: CPTII,S$GLB,, | Performed by: INTERNAL MEDICINE

## 2025-06-11 PROCEDURE — 3079F DIAST BP 80-89 MM HG: CPT | Mod: CPTII,S$GLB,, | Performed by: INTERNAL MEDICINE

## 2025-06-11 PROCEDURE — 3075F SYST BP GE 130 - 139MM HG: CPT | Mod: CPTII,S$GLB,, | Performed by: INTERNAL MEDICINE

## 2025-06-11 PROCEDURE — 3044F HG A1C LEVEL LT 7.0%: CPT | Mod: CPTII,S$GLB,, | Performed by: INTERNAL MEDICINE

## 2025-06-11 NOTE — PROGRESS NOTES
Patient ID: Maik Orr Jr. is a 29 y.o. male.    History of Present Illness    CHIEF COMPLAINT:  - Patient presents to discuss recent ECG findings and ongoing concerns about chest discomfort, particularly in relation to an upcoming international trip.    HPI:  Patient reports experiencing panic attacks and chest pain for 2 years. He describes the chest discomfort as occurring weekly, typically lasting for minutes at a time. Pain is localized to the left side of his chest, with some radiation down his arm. He notes that the chest pain subsided temporarily but has recurred.    Several factors exacerbate his symptoms. Alcohol consumption and anxiety-inducing situations worsen his symptoms. He is unable to identify specific factors that improve the discomfort.    He consulted two cardiologists in [place] in 2018. During these visits, he underwent a basic stress test (treadmill exercise test) and was informed of no significant findings.    He mentions a history of shoulder issues, including rotator cuff surgery 2 years ago. He speculates that his chest pain may be related to this, noting that pain is in the same region as his shoulder discomfort.    He reports being physically active, lifting weights 4 times per week without any issues. He feels well during these workouts.  No CV limitations.    He has a history of anxiety that previously manifested as GI symptoms. He consulted multiple gastroenterologists and underwent endoscopies before a psychologist identified anxiety as the root cause. He was prescribed Paxil, with improvement of his abdomen issues. He later switched to Zoloft for several years but discontinued it. Since stopping the medication, his anxiety symptoms have gradually returned, now manifesting as chest discomfort rather than stomach issues.    He denies experiencing exertional, anginal chest pain. 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 He denies current use of  Paxil or Zoloft.    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 Blood pressure: 130/80.  Cardiovascular: Regular rate and rhythm. Normal S1 and S2. No murmurs. No JVD. No bruits.  Extremities: No lower extremity edema.  Lungs: All normal.    LABS:  - CBC: 05/2025, normal  - CMP: 05/2025, normal  - Lipid Panel: 05/2025, Total cholesterol 205, HDL 52, , triglycerides 76  - A1C: 05/2025, normal  - TSH: 05/2025, normal    TEST RESULTS  (IMAGING REVIEWED DURING  CLINIC VISIT):  - Stress test 08/2018: normal per report  - ECG 05/2025: SR, RBBB         Assessment & Plan    R07.89 Other chest pain  I45.10 RBBB  F41.9 Anxiety    OTHER CHEST PAIN:  - Determined chronic chest pain syndrome is extremely unlikely to be related to a cardiac etiology  - Possible causes of chest discomfort include GI-related issues, musculoskeletal factors, and anxiety.  - No actionable cardiac issues based on history and current presentation.  - Provided reassurance, but gave patient option of TST if he would like.  He is comfortable with expectant management.    RBBB:  - RBBB on ECG is an incidental finding.    ANXIETY:  - Possible causes of chest discomfort include anxiety.  - Discussed the difficulty in distinguishing between anxiety-related symptoms and actual cardiac symptoms.  - Considered history of anxiety and its potential relation to chest discomfort.         This note was generated with the assistance of ambient listening technology. Verbal consent was obtained by the patient and accompanying visitor(s) for the recording of patient appointment to facilitate this note. I attest to having reviewed and  edited the generated note for accuracy, though some syntax or spelling errors may persist. Please contact the author of this note for any clarification.

## 2025-06-17 ENCOUNTER — PATIENT MESSAGE (OUTPATIENT)
Dept: PRIMARY CARE CLINIC | Facility: CLINIC | Age: 30
End: 2025-06-17
Payer: COMMERCIAL

## 2025-06-19 ENCOUNTER — OFFICE VISIT (OUTPATIENT)
Dept: PRIMARY CARE CLINIC | Facility: CLINIC | Age: 30
End: 2025-06-19
Payer: COMMERCIAL

## 2025-06-19 DIAGNOSIS — R14.3 FLATULENCE: ICD-10-CM

## 2025-06-19 DIAGNOSIS — R10.13 EPIGASTRIC BURNING SENSATION: ICD-10-CM

## 2025-06-19 DIAGNOSIS — F41.9 ANXIETY: ICD-10-CM

## 2025-06-19 DIAGNOSIS — R19.7 ACUTE DIARRHEA: Primary | ICD-10-CM

## 2025-06-19 PROCEDURE — 98006 SYNCH AUDIO-VIDEO EST MOD 30: CPT | Mod: 95,,, | Performed by: INTERNAL MEDICINE

## 2025-06-19 PROCEDURE — 1160F RVW MEDS BY RX/DR IN RCRD: CPT | Mod: CPTII,95,, | Performed by: INTERNAL MEDICINE

## 2025-06-19 PROCEDURE — 1159F MED LIST DOCD IN RCRD: CPT | Mod: CPTII,95,, | Performed by: INTERNAL MEDICINE

## 2025-06-19 PROCEDURE — 3044F HG A1C LEVEL LT 7.0%: CPT | Mod: CPTII,95,, | Performed by: INTERNAL MEDICINE

## 2025-06-19 RX ORDER — OMEPRAZOLE 20 MG/1
CAPSULE, DELAYED RELEASE ORAL
Qty: 30 CAPSULE | Refills: 0 | Status: SHIPPED | OUTPATIENT
Start: 2025-06-19

## 2025-06-19 RX ORDER — SERTRALINE HYDROCHLORIDE 50 MG/1
50 TABLET, FILM COATED ORAL DAILY
Qty: 90 TABLET | Refills: 0 | Status: SHIPPED | OUTPATIENT
Start: 2025-06-19 | End: 2025-06-19

## 2025-06-19 RX ORDER — ONDANSETRON 4 MG/1
4 TABLET, ORALLY DISINTEGRATING ORAL EVERY 8 HOURS PRN
Qty: 30 TABLET | Refills: 0 | Status: SHIPPED | OUTPATIENT
Start: 2025-06-19

## 2025-06-19 RX ORDER — SERTRALINE HYDROCHLORIDE 50 MG/1
TABLET, FILM COATED ORAL
Qty: 90 TABLET | Refills: 0 | Status: SHIPPED | OUTPATIENT
Start: 2025-06-19

## 2025-06-19 NOTE — PROGRESS NOTES
"[facility] Note    Chief Complaint    No chief complaint on file.      History of Present Illness      Maik Orr Jr. is a 29 y.o.    Anxiety, DJD - L spine, Postlaminectomy Syndrome presents to fu same day visit for urinary issues. Referred by his Mom- Pati Orr. Last visit - 5/22/25    The patient location is: work  The chief complaint leading to consultation is: "fu c/o GI issues incl Nasuea, Diarrhea, flatulence, and dec appetite"    Visit type: audiovisual    Face to Face time with patient: 19 min.  19 minutes of total time spent on the encounter, which includes face to face time and non-face to face time preparing to see the patient (eg, review of tests), Obtaining and/or reviewing separately obtained history, Documenting clinical information in the electronic or other health record, Independently interpreting results (not separately reported) and communicating results to the patient/family/caregiver, or Care coordination (not separately reported).         Each patient to whom he or she provides medical services by telemedicine is:  (1) informed of the relationship between the physician and patient and the respective role of any other health care provider with respect to management of the patient; and (2) notified that he or she may decline to receive medical services by telemedicine and may withdraw from such care at any time.    Notes:           Diarrhea - the prev. Sunday, Monday, and Tuesday he had diarrhea.  He started feeling better until this most recent Sunday, when he vomited multiple times throughout the day.  For the david 4 days he has been having diarrhea. He could not eat last night due to nausea and stomach feeling it was on fire. He would also like a Rx of Zofran.  Zofran has been a lifesaver for him in the past when it comes to anxiety induced nausea. ?IBS. He is taking immodium, pepto, Gas -x. Stop immoduium. Will check Stools studies, Abd u/sylase, lipase, lytes.  D/w pt " sx's of Dehydration for which to go to ED for IVF. Rec OTC Probiotic such as  culturelle. Will Rx 2-4 wk trial of omeprazole for his epigastric burning.      Borderline HLD - Fu with [facility], Dr. Belle - Cholesterol looked Borderline high. Pt does not require medication for this at this time but I do recommend a low cholesterol diet and exercise. He can visit the American heart association website at heart.org for further info on a low cholesterol diet.      Hyperbilirubinemia- bilirubin level was slightly high again at 1.4. This is usually benign & not worrisome. It is common & can elevate when you are dehydrated (as with fasting for lab work). Sometimes this can be mildly elevated due to a genetic disorder called Gilbert's which does not require any intervention or treatment. We will check a Liver ultrasound      Left shoulder pain - Fu by Dr. Heath. He dx STAM, functional very end where it this could respond and she would respond to a good rehabilitation program. 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 He saw Cards x 2 in past and was reassured. He started having The pain while lying in bed with his GF watching tV.  T's better when he moves the arm and is ative or if he isn't thinking about it. He was not anxious at that time. Does not bother him in court.  Sitting in jury duty he had it.  While at his GF's graduation he had one. Left rotator cuff tear and labrum tear - s/p Left rotator cuff tear repair in Nov. 2022.  Doing well.  Fu by Dr. Heath.      Anxiety - Pt weaned off sertraline 50 mg/d in '19. It caused some Dec libido and didn't help much with his Anxiety.   He was on paxil in past- felt worse. Had panic attacks in [place] 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  He tried gluten free diet - no help. No SI, no HI.  He does report 1-2 panic attacks that were unprovoked when he is lying in bad at night. Gets an electric jolt feeling when he first weaned - this has resolved. He prefers to try not to resume pharmacotherapy but is aware we can resume this again.  He exercises x 4/wk.  He would like to see a therapist. He freq thinks about a falling out he had with 2 lifelong friends in 2021 and would like to discuss this in therapy.  He started having panic attacks again. He will suddenly get anxious and get Left CP that radiates from his scapula.  These occur 1/wk. He wants to resume sertraline.      PHAN L -spine -  Was working out 5 d/wk until he tore rotator cuff.  He is not on any meds for this. He gets injections prn.      Postlaminectomy Syndrome - Was working out 5 d/wk.  He is not on any meds for this. He gets injections prn.      Lumbar stenosis -  Was working out 5 d/wk.  He is not on any meds for this. He gets injections prn.      Heart murmur -   Pt aware since childhood. Had prev stress Echo. EKG with Cards. He fu with Chesetr Belle.      Concentration deficit - Pt has never been formally tested. Rec he do so. He took 5 mg 1-2/wk since College. He is a  and it helps him focus.      HCM - Flu - none;  Tdap -8/4/10 - due;  Gardasil - 7/9/12;  Hep C Screen - 6/4/24- neg;  HIV Screen - 6/4/24- neg; C-scope - none;  Prev PCP -Dr. Florian Schaefer at [facility] and saw Dr. Fulton at [facility] once; Ortho - Dr. Yeung; Pain Mgmnt - ?; Ortho - Dr. Norman/Ivana;CRS - Dr. Robert       Patient Care Team:  Becca Bynum MD as PCP - General (Internal Medicine)     Health Maintenance:  Immunization History   Administered Date(s) Administered    DTaP 1994, 1994, 01/15/1995, 01/03/1996, 07/13/1999    HIB 1994, 1994, 01/05/1995, 10/12/1995    HPV Quadrivalent 07/09/2012    Hepatitis B,  Pediatric/Adolescent 1994, 1994, 04/03/1995    IPV 1994, 1994, 01/03/1996, 07/13/1999    Influenza - Intranasal 10/02/2007    Influenza - Quadrivalent - MDCK - PF 10/30/2017, 11/13/2019    Influenza - Quadrivalent - PF *Preferred* (6 months and older) 10/16/2002, 10/15/2003, 10/08/2004, 10/26/2005, 10/20/2006, 09/16/2009, 10/20/2014, 10/05/2015, 10/12/2016, 11/09/2020, 10/26/2022    Influenza A (H1N1) 2009 Monovalent - IM 11/03/2009    MMR 10/12/1995, 07/13/1999    Meningococcal Conjugate (MCV4P) 08/02/2005, 07/09/2012    PPD Test 07/07/1995    Td (Adult), Unspecified Formulation 08/02/2005    Tdap 08/04/2010    Varicella 03/01/2007, 07/08/2009      Health Maintenance   Topic Date Due    Pneumococcal Vaccines (Age 0-49) (1 of 2 - PCV) Never done    TETANUS VACCINE  08/04/2020    COVID-19 Vaccine (1 - 2024-25 season) Never done    Influenza Vaccine (Season Ended) 09/01/2025    RSV Vaccine (Age 60+ and Pregnant patients) (1 - 1-dose 75+ series) 07/04/2069    Hepatitis C Screening  Completed    HIV Screening  Completed    Lipid Panel  Completed        Past Medical History:  Past Medical History:   Diagnosis Date    Anxiety        Past Surgical History:   has a past surgical history that includes Lumbar discectomy; Foraminotomy; Laminectomy; Epidural steroid injection into lumbar spine; Shoulder arthroscopy w/ rotator cuff repair (Left); Shoulder arthroscopy w/ labral repair (Left); and Spine surgery (2013).    Family History:  family history includes Bladder Cancer in his maternal grandmother; Lung cancer in his paternal grandfather; Ovarian cancer in his maternal grandmother; Pancreatitis in his father; Suicidality in his maternal grandfather.     Social History:  Social History[1]    Review of Systems   Constitutional:  Negative for activity change, chills, fever, night sweats and unexpected weight change.   HENT:  Negative for hearing loss, rhinorrhea and trouble swallowing. Sneezing: zofran.    Eyes:  Negative for discharge and visual disturbance.   Respiratory:  Negative for chest tightness and wheezing.    Cardiovascular:  Negative for chest pain and palpitations.   Gastrointestinal:  Positive for abdominal distention, abdominal pain, diarrhea and vomiting. Negative for blood in stool and constipation.        No BRBPR no Melena. Yest he had 20 stools- watery, light brown.    Endocrine: Negative for polydipsia and polyuria.   Genitourinary:  Negative for difficulty urinating, hematuria and urgency.   Musculoskeletal:  Negative for arthralgias, joint swelling and neck pain.   Neurological:  Positive for headaches. Negative for weakness.   Psychiatric/Behavioral:  Negative for confusion and dysphoric mood.         Medications:  Current Medications[2]     Allergies:  Review of patient's allergies indicates:   Allergen Reactions    Bactrim [sulfamethoxazole-trimethoprim]      swelling and discomfort down below       Physical Exam                    Physical Exam  Constitutional:       General: He is not in acute distress.     Appearance: Normal appearance. He is not ill-appearing, toxic-appearing or diaphoretic.   HENT:      Head: Normocephalic and atraumatic.      Mouth/Throat:      Mouth: Mucous membranes are dry.      Comments: Slightly dry  Pulmonary:      Effort: No respiratory distress.   Abdominal:      Tenderness: There is no abdominal tenderness.   Neurological:      General: No focal deficit present.      Mental Status: He is alert and oriented to person, place, and time.   Psychiatric:         Mood and Affect: Mood normal.         Behavior: Behavior normal.          Laboratory:  CBC:  Recent Labs   Lab 08/15/23  0819 05/22/25  1142   WBC 4.1 L 4.60   RBC 4.85 4.85   Hemoglobin 14.8  --    HGB  --  14.4   Hematocrit 43.3  --    HCT  --  43.3   Platelet Count  --  239   Platelets 224  --    MCV 89.4 89   MCH 30.6 29.7   MCHC 34.2 33.3       CMP:  Recent Labs   Lab 08/15/23  0819 05/22/25  1142    Glucose 100 101   Calcium 10.5 9.9   Albumin  --  4.5   ALBUMIN 4.5  --    Protein Total  --  7.4   Total Protein 6.6  --    Sodium 138 141   Potassium 4.9 4.3   CO2 27 25   Chloride 101 107   BUN 16.0 15   Creatinine 0.95 1.0   Alkaline Phosphatase 54  --    ALP  --  64   ALT 15 18   AST 21 22   Total Bilirubin 1.2  --    Bilirubin Total  --  1.4 H           URINALYSIS:  Recent Labs   Lab 06/04/24  1624   Color, UA Colorless A   Specific Gravity, UA 1.005   pH, UA 7.0   Protein, UA Negative   Nitrite, UA Negative   Leukocytes, UA Trace A        LIPIDS:  Recent Labs   Lab 08/15/23  0819 05/22/25  1142   TSH 3.47 2.214   HDL 57  --    HDL Cholesterol  --  52   Cholesterol Total  --  205 H   Cholesterol 189 H  --    Triglycerides 145  --    Triglyceride  --  76   LDL Cholesterol  --  137.8   LDL Calculated 109 H  --    HDL/Cholesterol Ratio  --  25.4   Non HDL Cholesterol  --  153   Cholesterol/HDL Ratio  --  3.9       TSH:  Recent Labs   Lab 08/15/23  0819 05/22/25  1142   TSH 3.47 2.214       A1C:  Recent Labs   Lab 05/22/25  1142   Hemoglobin A1c 4.9            Recent Labs   Lab 06/04/24  1628   PSA, Screen 0.52   Hepatitis C Ab Non-reactive       Assessment/Plan     Maik Orr Jr. is a 29 y.o.male with:    Acute diarrhea  -     Comprehensive Metabolic Panel; Future; Expected date: 06/19/2025  -     CBC Auto Differential; Future; Expected date: 06/19/2025  -     Clostridioides difficile EIA; Future; Expected date: 06/19/2025  -     Giardia / Cryptosporidum, EIA; Future; Expected date: 06/19/2025  -     Stool culture; Future; Expected date: 06/19/2025  -     WBC, Stool; Future; Expected date: 06/19/2025  -     Ambulatory referral/consult to Gastroenterology; Future; Expected date: 06/26/2025  -     US Abdomen Complete; Future; Expected date: 06/19/2025  - Stop immoduium. Will check Stools studies, Abd u/s, Amylase, lipase, lytes.  D/w pt sx's of Dehydration for which to go to ED for IVF. Rec OTC Probiotic such as   jeff. Will Rx 2-4 wk trial of omeprazole for his epigastric burning.     Epigastric burning sensation  -     omeprazole (PRILOSEC) 20 MG capsule; 1 po daily x 2-4 wks  Dispense: 30 capsule; Refill: 0  -     Amylase; Future; Expected date: 06/19/2025  -     Lipase; Future; Expected date: 06/19/2025  -     US Abdomen Complete; Future; Expected date: 06/19/2025  -Will Rx 2-4 wk trial of omeprazole for his epigastric burning.      Anxiety  -     sertraline (ZOLOFT) 50 MG tablet; 1/2 tab daily x 2 wks and then inc to 1 tab daily  Dispense: 90 tablet; Refill: 0  -will try sertraline again.  Warned pt about potential Side effects. Pt aware of FDA/BB warning. Pt aware of potential for withdrawal with abrupt discontinuation. Pt aware it will take 4-6 wks to feel full effects of this medication.  Pt will alert MD for any concerns incl SI/HI.  RTC in 6 wks for fu.     Flatulence  -     US Abdomen Complete; Future; Expected date: 06/19/2025  -check Abd U/s to r/o GB dis.     Other orders  -     ondansetron (ZOFRAN-ODT) 4 MG TbDL; Take 1 tablet (4 mg total) by mouth every 8 (eight) hours as needed.  Dispense: 30 tablet; Refill: 0         Chronic conditions status updated as per HPI.  Other than changes above, cont current medications and maintain follow up with specialists.    Follow up in about 11 months (around 5/23/2026) for well visit or sooner if needed.      Becca Bynum MD  [facility]                       [1]   Social History  Tobacco Use    Smoking status: Some Days     Types: Vaping with nicotine    Smokeless tobacco: Never    Tobacco comments:     Nicotine gum/pouch and vape on occasion   Vaping Use    Vaping status: Some Days   Substance Use Topics    Alcohol use: Yes     Alcohol/week: 10.0 standard drinks of alcohol     Types: 10 Cans of beer per week     Comment: 2-5 x 2/wk    Drug use: Not Currently     Types: Marijuana     Comment: rare   [2]   Current Outpatient Medications:     ALPRAZolam  (XANAX) 0.5 MG tablet, 1 po 30-60 min before flight, Disp: 30 tablet, Rfl: 0    omeprazole (PRILOSEC) 20 MG capsule, 1 po daily x 2-4 wks, Disp: 30 capsule, Rfl: 0    ondansetron (ZOFRAN-ODT) 4 MG TbDL, Take 1 tablet (4 mg total) by mouth every 8 (eight) hours as needed., Disp: 30 tablet, Rfl: 0    sertraline (ZOLOFT) 50 MG tablet, 1/2 tab daily x 2 wks and then inc to 1 tab daily, Disp: 90 tablet, Rfl: 0

## 2025-06-20 ENCOUNTER — PATIENT MESSAGE (OUTPATIENT)
Dept: PRIMARY CARE CLINIC | Facility: CLINIC | Age: 30
End: 2025-06-20
Payer: COMMERCIAL

## 2025-06-20 ENCOUNTER — LAB VISIT (OUTPATIENT)
Dept: LAB | Facility: HOSPITAL | Age: 30
End: 2025-06-20
Attending: INTERNAL MEDICINE
Payer: COMMERCIAL

## 2025-06-20 DIAGNOSIS — R19.7 ACUTE DIARRHEA: ICD-10-CM

## 2025-06-20 DIAGNOSIS — R10.13 EPIGASTRIC BURNING SENSATION: ICD-10-CM

## 2025-06-20 LAB
ABSOLUTE EOSINOPHIL (OHS): 0.07 K/UL
ABSOLUTE MONOCYTE (OHS): 0.58 K/UL (ref 0.3–1)
ABSOLUTE NEUTROPHIL COUNT (OHS): 3.98 K/UL (ref 1.8–7.7)
ALBUMIN SERPL BCP-MCNC: 4.1 G/DL (ref 3.5–5.2)
ALP SERPL-CCNC: 62 UNIT/L (ref 40–150)
ALT SERPL W/O P-5'-P-CCNC: 15 UNIT/L (ref 10–44)
AMYLASE SERPL-CCNC: 31 UNIT/L (ref 20–110)
ANION GAP (OHS): 10 MMOL/L (ref 8–16)
AST SERPL-CCNC: 20 UNIT/L (ref 11–45)
BASOPHILS # BLD AUTO: 0.04 K/UL
BASOPHILS NFR BLD AUTO: 0.6 %
BILIRUB SERPL-MCNC: 0.9 MG/DL (ref 0.1–1)
BUN SERPL-MCNC: 9 MG/DL (ref 6–20)
CALCIUM SERPL-MCNC: 9.5 MG/DL (ref 8.7–10.5)
CHLORIDE SERPL-SCNC: 104 MMOL/L (ref 95–110)
CO2 SERPL-SCNC: 26 MMOL/L (ref 23–29)
CREAT SERPL-MCNC: 1 MG/DL (ref 0.5–1.4)
ERYTHROCYTE [DISTWIDTH] IN BLOOD BY AUTOMATED COUNT: 11.4 % (ref 11.5–14.5)
GFR SERPLBLD CREATININE-BSD FMLA CKD-EPI: >60 ML/MIN/1.73/M2
GLUCOSE SERPL-MCNC: 86 MG/DL (ref 70–110)
HCT VFR BLD AUTO: 40.3 % (ref 40–54)
HGB BLD-MCNC: 13.8 GM/DL (ref 14–18)
IMM GRANULOCYTES # BLD AUTO: 0.04 K/UL (ref 0–0.04)
IMM GRANULOCYTES NFR BLD AUTO: 0.6 % (ref 0–0.5)
LIPASE SERPL-CCNC: 26 U/L (ref 4–60)
LYMPHOCYTES # BLD AUTO: 1.7 K/UL (ref 1–4.8)
MCH RBC QN AUTO: 29.4 PG (ref 27–31)
MCHC RBC AUTO-ENTMCNC: 34.2 G/DL (ref 32–36)
MCV RBC AUTO: 86 FL (ref 82–98)
NUCLEATED RBC (/100WBC) (OHS): 0 /100 WBC
PLATELET # BLD AUTO: 299 K/UL (ref 150–450)
PMV BLD AUTO: 10.9 FL (ref 9.2–12.9)
POTASSIUM SERPL-SCNC: 4 MMOL/L (ref 3.5–5.1)
PROT SERPL-MCNC: 6.7 GM/DL (ref 6–8.4)
RBC # BLD AUTO: 4.7 M/UL (ref 4.6–6.2)
RELATIVE EOSINOPHIL (OHS): 1.1 %
RELATIVE LYMPHOCYTE (OHS): 26.5 % (ref 18–48)
RELATIVE MONOCYTE (OHS): 9 % (ref 4–15)
RELATIVE NEUTROPHIL (OHS): 62.2 % (ref 38–73)
SODIUM SERPL-SCNC: 140 MMOL/L (ref 136–145)
WBC # BLD AUTO: 6.41 K/UL (ref 3.9–12.7)

## 2025-06-20 PROCEDURE — 82150 ASSAY OF AMYLASE: CPT

## 2025-06-20 PROCEDURE — 36415 COLL VENOUS BLD VENIPUNCTURE: CPT

## 2025-06-20 PROCEDURE — 85025 COMPLETE CBC W/AUTO DIFF WBC: CPT

## 2025-06-20 PROCEDURE — 83690 ASSAY OF LIPASE: CPT

## 2025-06-20 PROCEDURE — 80053 COMPREHEN METABOLIC PANEL: CPT

## 2025-06-23 ENCOUNTER — RESULTS FOLLOW-UP (OUTPATIENT)
Dept: PRIMARY CARE CLINIC | Facility: CLINIC | Age: 30
End: 2025-06-23
Payer: COMMERCIAL

## 2025-06-23 DIAGNOSIS — R19.7 ACUTE DIARRHEA: ICD-10-CM

## 2025-06-23 DIAGNOSIS — D64.9 NORMOCYTIC ANEMIA: Primary | ICD-10-CM

## 2025-06-23 NOTE — PROGRESS NOTES
I sent pt a my chart message -  I reviewed your labs.  Your pancreatic enzymes (Amylase and Lipase) were normal.  Your kidney function and liver functions looked good.  You are borderline anemic. How are your GI issues?  We can repeat this and check some iron studies and a B12, Folate in 4 wks.   Dr. RAMOS

## 2025-06-23 NOTE — TELEPHONE ENCOUNTER
Ok sounds good. Borderline anemic just means his hemoglobin was just below normal at 13.8.  Normal is 14. We can just repeat this with his labs I placed for iron, etc.  Dr. RAMOS

## 2025-06-24 NOTE — TELEPHONE ENCOUNTER
No rush on the labs. No need to take a supplement while we await his labs.  Don;t think a Zpack is necessary as this can worsen diarrhea if not needed.   Dr. RAMOS

## 2025-06-27 NOTE — TELEPHONE ENCOUNTER
The orders are still in so he can leave the specimen when he has a chance.  He already has the referral to GI in case this persists. Also he still has the order for the ultrasound. Make sure he started the probiotic.  Dr. PAREKH